# Patient Record
Sex: MALE | Race: WHITE | NOT HISPANIC OR LATINO | Employment: FULL TIME | URBAN - METROPOLITAN AREA
[De-identification: names, ages, dates, MRNs, and addresses within clinical notes are randomized per-mention and may not be internally consistent; named-entity substitution may affect disease eponyms.]

---

## 2017-07-12 RX ORDER — LISINOPRIL 20 MG/1
TABLET ORAL
Qty: 30 TABLET | Refills: 0 | Status: SHIPPED | OUTPATIENT
Start: 2017-07-12 | End: 2021-11-19 | Stop reason: SDUPTHER

## 2023-05-05 ENCOUNTER — APPOINTMENT (OUTPATIENT)
Dept: CT IMAGING | Facility: HOSPITAL | Age: 58
End: 2023-05-05
Payer: COMMERCIAL

## 2023-05-05 ENCOUNTER — HOSPITAL ENCOUNTER (OUTPATIENT)
Facility: HOSPITAL | Age: 58
Setting detail: OBSERVATION
Discharge: HOME | End: 2023-05-06
Attending: EMERGENCY MEDICINE | Admitting: STUDENT IN AN ORGANIZED HEALTH CARE EDUCATION/TRAINING PROGRAM
Payer: COMMERCIAL

## 2023-05-05 ENCOUNTER — APPOINTMENT (OUTPATIENT)
Dept: GENERAL RADIOLOGY | Facility: HOSPITAL | Age: 58
End: 2023-05-05
Payer: COMMERCIAL

## 2023-05-05 DIAGNOSIS — R19.7 DIARRHEA, UNSPECIFIED TYPE: ICD-10-CM

## 2023-05-05 DIAGNOSIS — N17.9 AKI (ACUTE KIDNEY INJURY): ICD-10-CM

## 2023-05-05 DIAGNOSIS — R11.2 NAUSEA AND VOMITING, UNSPECIFIED VOMITING TYPE: ICD-10-CM

## 2023-05-05 DIAGNOSIS — N28.89 LEFT RENAL MASS: ICD-10-CM

## 2023-05-05 DIAGNOSIS — D72.829 LEUKOCYTOSIS, UNSPECIFIED TYPE: Primary | ICD-10-CM

## 2023-05-05 LAB
ALBUMIN SERPL-MCNC: 4.4 G/DL (ref 3.5–5.2)
ALBUMIN/GLOB SERPL: 1.4 G/DL
ALP SERPL-CCNC: 89 U/L (ref 39–117)
ALT SERPL W P-5'-P-CCNC: 16 U/L (ref 1–41)
ANION GAP SERPL CALCULATED.3IONS-SCNC: 14.3 MMOL/L (ref 5–15)
AST SERPL-CCNC: 14 U/L (ref 1–40)
BACTERIA UR QL AUTO: ABNORMAL /HPF
BASOPHILS # BLD AUTO: 0.02 10*3/MM3 (ref 0–0.2)
BASOPHILS NFR BLD AUTO: 0.1 % (ref 0–1.5)
BILIRUB SERPL-MCNC: 1 MG/DL (ref 0–1.2)
BILIRUB UR QL STRIP: NEGATIVE
BUN SERPL-MCNC: 21 MG/DL (ref 6–20)
BUN/CREAT SERPL: 13.4 (ref 7–25)
CALCIUM SPEC-SCNC: 8.8 MG/DL (ref 8.6–10.5)
CHLORIDE SERPL-SCNC: 99 MMOL/L (ref 98–107)
CLARITY UR: CLEAR
CO2 SERPL-SCNC: 21.7 MMOL/L (ref 22–29)
COLOR UR: YELLOW
CREAT SERPL-MCNC: 1.57 MG/DL (ref 0.76–1.27)
D-LACTATE SERPL-SCNC: 2.1 MMOL/L (ref 0.5–2)
DEPRECATED RDW RBC AUTO: 43.4 FL (ref 37–54)
EGFRCR SERPLBLD CKD-EPI 2021: 51.7 ML/MIN/1.73
EOSINOPHIL # BLD AUTO: 0 10*3/MM3 (ref 0–0.4)
EOSINOPHIL NFR BLD AUTO: 0 % (ref 0.3–6.2)
ERYTHROCYTE [DISTWIDTH] IN BLOOD BY AUTOMATED COUNT: 13.6 % (ref 12.3–15.4)
GLOBULIN UR ELPH-MCNC: 3.2 GM/DL
GLUCOSE SERPL-MCNC: 179 MG/DL (ref 65–99)
GLUCOSE UR STRIP-MCNC: NEGATIVE MG/DL
HCT VFR BLD AUTO: 51.2 % (ref 37.5–51)
HGB BLD-MCNC: 16.9 G/DL (ref 13–17.7)
HGB UR QL STRIP.AUTO: ABNORMAL
HOLD SPECIMEN: NORMAL
HOLD SPECIMEN: NORMAL
HYALINE CASTS UR QL AUTO: ABNORMAL /LPF
IMM GRANULOCYTES # BLD AUTO: 0.09 10*3/MM3 (ref 0–0.05)
IMM GRANULOCYTES NFR BLD AUTO: 0.5 % (ref 0–0.5)
KETONES UR QL STRIP: NEGATIVE
LEUKOCYTE ESTERASE UR QL STRIP.AUTO: NEGATIVE
LIPASE SERPL-CCNC: 13 U/L (ref 13–60)
LYMPHOCYTES # BLD AUTO: 1.08 10*3/MM3 (ref 0.7–3.1)
LYMPHOCYTES NFR BLD AUTO: 5.4 % (ref 19.6–45.3)
MCH RBC QN AUTO: 28.4 PG (ref 26.6–33)
MCHC RBC AUTO-ENTMCNC: 33 G/DL (ref 31.5–35.7)
MCV RBC AUTO: 85.9 FL (ref 79–97)
MONOCYTES # BLD AUTO: 1.07 10*3/MM3 (ref 0.1–0.9)
MONOCYTES NFR BLD AUTO: 5.4 % (ref 5–12)
NEUTROPHILS NFR BLD AUTO: 17.56 10*3/MM3 (ref 1.7–7)
NEUTROPHILS NFR BLD AUTO: 88.6 % (ref 42.7–76)
NITRITE UR QL STRIP: NEGATIVE
PH UR STRIP.AUTO: 5.5 [PH] (ref 4.5–8)
PLATELET # BLD AUTO: 232 10*3/MM3 (ref 140–450)
PMV BLD AUTO: 10.2 FL (ref 6–12)
POTASSIUM SERPL-SCNC: 3.8 MMOL/L (ref 3.5–5.2)
PROCALCITONIN SERPL-MCNC: 0.66 NG/ML (ref 0–0.25)
PROT SERPL-MCNC: 7.6 G/DL (ref 6–8.5)
PROT UR QL STRIP: ABNORMAL
RBC # BLD AUTO: 5.96 10*6/MM3 (ref 4.14–5.8)
RBC # UR STRIP: ABNORMAL /HPF
REF LAB TEST METHOD: ABNORMAL
SODIUM SERPL-SCNC: 135 MMOL/L (ref 136–145)
SP GR UR STRIP: 1.02 (ref 1–1.03)
SQUAMOUS #/AREA URNS HPF: ABNORMAL /HPF
UROBILINOGEN UR QL STRIP: ABNORMAL
WBC # UR STRIP: ABNORMAL /HPF
WBC NRBC COR # BLD: 19.82 10*3/MM3 (ref 3.4–10.8)
WHOLE BLOOD HOLD COAG: NORMAL
WHOLE BLOOD HOLD SPECIMEN: NORMAL

## 2023-05-05 PROCEDURE — 25010000002 MORPHINE PER 10 MG: Performed by: EMERGENCY MEDICINE

## 2023-05-05 PROCEDURE — 74177 CT ABD & PELVIS W/CONTRAST: CPT

## 2023-05-05 PROCEDURE — 81001 URINALYSIS AUTO W/SCOPE: CPT

## 2023-05-05 PROCEDURE — 96376 TX/PRO/DX INJ SAME DRUG ADON: CPT

## 2023-05-05 PROCEDURE — G0378 HOSPITAL OBSERVATION PER HR: HCPCS

## 2023-05-05 PROCEDURE — 87086 URINE CULTURE/COLONY COUNT: CPT

## 2023-05-05 PROCEDURE — 25510000001 IOPAMIDOL PER 1 ML: Performed by: EMERGENCY MEDICINE

## 2023-05-05 PROCEDURE — 25010000002 ONDANSETRON PER 1 MG: Performed by: STUDENT IN AN ORGANIZED HEALTH CARE EDUCATION/TRAINING PROGRAM

## 2023-05-05 PROCEDURE — 25010000002 ONDANSETRON PER 1 MG

## 2023-05-05 PROCEDURE — 96375 TX/PRO/DX INJ NEW DRUG ADDON: CPT

## 2023-05-05 PROCEDURE — 85025 COMPLETE CBC W/AUTO DIFF WBC: CPT | Performed by: EMERGENCY MEDICINE

## 2023-05-05 PROCEDURE — 87040 BLOOD CULTURE FOR BACTERIA: CPT

## 2023-05-05 PROCEDURE — 80053 COMPREHEN METABOLIC PANEL: CPT | Performed by: EMERGENCY MEDICINE

## 2023-05-05 PROCEDURE — 36415 COLL VENOUS BLD VENIPUNCTURE: CPT

## 2023-05-05 PROCEDURE — 84145 PROCALCITONIN (PCT): CPT

## 2023-05-05 PROCEDURE — 96365 THER/PROPH/DIAG IV INF INIT: CPT

## 2023-05-05 PROCEDURE — 83690 ASSAY OF LIPASE: CPT | Performed by: EMERGENCY MEDICINE

## 2023-05-05 PROCEDURE — 83605 ASSAY OF LACTIC ACID: CPT

## 2023-05-05 PROCEDURE — 71045 X-RAY EXAM CHEST 1 VIEW: CPT

## 2023-05-05 PROCEDURE — 99284 EMERGENCY DEPT VISIT MOD MDM: CPT

## 2023-05-05 PROCEDURE — 94640 AIRWAY INHALATION TREATMENT: CPT

## 2023-05-05 RX ORDER — LOPERAMIDE HYDROCHLORIDE 2 MG/1
2 CAPSULE ORAL ONCE
Status: COMPLETED | OUTPATIENT
Start: 2023-05-05 | End: 2023-05-05

## 2023-05-05 RX ORDER — SODIUM CHLORIDE 0.9 % (FLUSH) 0.9 %
10 SYRINGE (ML) INJECTION EVERY 12 HOURS SCHEDULED
Status: DISCONTINUED | OUTPATIENT
Start: 2023-05-05 | End: 2023-05-06 | Stop reason: HOSPADM

## 2023-05-05 RX ORDER — ONDANSETRON 2 MG/ML
4 INJECTION INTRAMUSCULAR; INTRAVENOUS EVERY 6 HOURS PRN
Status: DISCONTINUED | OUTPATIENT
Start: 2023-05-05 | End: 2023-05-06 | Stop reason: HOSPADM

## 2023-05-05 RX ORDER — ONDANSETRON 2 MG/ML
8 INJECTION INTRAMUSCULAR; INTRAVENOUS ONCE
Status: COMPLETED | OUTPATIENT
Start: 2023-05-05 | End: 2023-05-05

## 2023-05-05 RX ORDER — KETOROLAC TROMETHAMINE 30 MG/ML
30 INJECTION, SOLUTION INTRAMUSCULAR; INTRAVENOUS EVERY 6 HOURS PRN
Status: DISCONTINUED | OUTPATIENT
Start: 2023-05-05 | End: 2023-05-05

## 2023-05-05 RX ORDER — ACETAMINOPHEN 500 MG
1000 TABLET ORAL EVERY 8 HOURS
Status: DISCONTINUED | OUTPATIENT
Start: 2023-05-05 | End: 2023-05-06 | Stop reason: HOSPADM

## 2023-05-05 RX ORDER — SODIUM CHLORIDE, SODIUM LACTATE, POTASSIUM CHLORIDE, CALCIUM CHLORIDE 600; 310; 30; 20 MG/100ML; MG/100ML; MG/100ML; MG/100ML
100 INJECTION, SOLUTION INTRAVENOUS CONTINUOUS
Status: DISCONTINUED | OUTPATIENT
Start: 2023-05-05 | End: 2023-05-06

## 2023-05-05 RX ORDER — OXYCODONE HYDROCHLORIDE 5 MG/1
5 TABLET ORAL EVERY 4 HOURS PRN
Status: DISCONTINUED | OUTPATIENT
Start: 2023-05-05 | End: 2023-05-06 | Stop reason: HOSPADM

## 2023-05-05 RX ORDER — SODIUM CHLORIDE 0.9 % (FLUSH) 0.9 %
10 SYRINGE (ML) INJECTION AS NEEDED
Status: DISCONTINUED | OUTPATIENT
Start: 2023-05-05 | End: 2023-05-06 | Stop reason: HOSPADM

## 2023-05-05 RX ORDER — ACETAMINOPHEN 500 MG
1000 TABLET ORAL EVERY 8 HOURS PRN
Status: DISCONTINUED | OUTPATIENT
Start: 2023-05-05 | End: 2023-05-05

## 2023-05-05 RX ORDER — SODIUM CHLORIDE 9 MG/ML
40 INJECTION, SOLUTION INTRAVENOUS AS NEEDED
Status: DISCONTINUED | OUTPATIENT
Start: 2023-05-05 | End: 2023-05-06 | Stop reason: HOSPADM

## 2023-05-05 RX ORDER — BUDESONIDE AND FORMOTEROL FUMARATE DIHYDRATE 160; 4.5 UG/1; UG/1
1 AEROSOL RESPIRATORY (INHALATION)
Status: DISCONTINUED | OUTPATIENT
Start: 2023-05-05 | End: 2023-05-06 | Stop reason: HOSPADM

## 2023-05-05 RX ORDER — ACETAMINOPHEN 500 MG
1000 TABLET ORAL ONCE
Status: COMPLETED | OUTPATIENT
Start: 2023-05-05 | End: 2023-05-05

## 2023-05-05 RX ORDER — OXYCODONE HYDROCHLORIDE 5 MG/1
TABLET ORAL
Status: COMPLETED
Start: 2023-05-05 | End: 2023-05-05

## 2023-05-05 RX ORDER — CHOLECALCIFEROL (VITAMIN D3) 125 MCG
5 CAPSULE ORAL NIGHTLY PRN
Status: DISCONTINUED | OUTPATIENT
Start: 2023-05-05 | End: 2023-05-06 | Stop reason: HOSPADM

## 2023-05-05 RX ORDER — ACETAMINOPHEN 500 MG
TABLET ORAL
Status: COMPLETED
Start: 2023-05-05 | End: 2023-05-05

## 2023-05-05 RX ADMIN — MORPHINE SULFATE 4 MG: 4 INJECTION, SOLUTION INTRAMUSCULAR; INTRAVENOUS at 14:29

## 2023-05-05 RX ADMIN — OXYCODONE HYDROCHLORIDE 5 MG: 5 TABLET ORAL at 21:02

## 2023-05-05 RX ADMIN — ACETAMINOPHEN 1000 MG: 500 TABLET, FILM COATED ORAL at 21:02

## 2023-05-05 RX ADMIN — SODIUM CHLORIDE 1000 ML: 9 INJECTION, SOLUTION INTRAVENOUS at 16:56

## 2023-05-05 RX ADMIN — MORPHINE SULFATE 4 MG: 4 INJECTION, SOLUTION INTRAMUSCULAR; INTRAVENOUS at 17:43

## 2023-05-05 RX ADMIN — BUDESONIDE AND FORMOTEROL FUMARATE DIHYDRATE 1 PUFF: 160; 4.5 AEROSOL RESPIRATORY (INHALATION) at 21:51

## 2023-05-05 RX ADMIN — ONDANSETRON 8 MG: 2 INJECTION INTRAMUSCULAR; INTRAVENOUS at 13:54

## 2023-05-05 RX ADMIN — LOPERAMIDE HYDROCHLORIDE 2 MG: 2 CAPSULE ORAL at 13:54

## 2023-05-05 RX ADMIN — SODIUM CHLORIDE 1000 ML: 9 INJECTION, SOLUTION INTRAVENOUS at 13:55

## 2023-05-05 RX ADMIN — ACETAMINOPHEN 1000 MG: 500 TABLET ORAL at 21:02

## 2023-05-05 RX ADMIN — IOPAMIDOL 100 ML: 755 INJECTION, SOLUTION INTRAVENOUS at 15:03

## 2023-05-05 RX ADMIN — ONDANSETRON 4 MG: 2 INJECTION INTRAMUSCULAR; INTRAVENOUS at 21:10

## 2023-05-05 RX ADMIN — SODIUM CHLORIDE, POTASSIUM CHLORIDE, SODIUM LACTATE AND CALCIUM CHLORIDE 100 ML/HR: 600; 310; 30; 20 INJECTION, SOLUTION INTRAVENOUS at 21:03

## 2023-05-05 RX ADMIN — Medication 10 ML: at 21:02

## 2023-05-05 RX ADMIN — ACETAMINOPHEN 1000 MG: 500 TABLET ORAL at 16:55

## 2023-05-05 NOTE — ED NOTES
"Nursing report ED to floor  Carroll Zamora  57 y.o.  male    HPI :   Chief Complaint   Patient presents with    Abdominal Pain     Starting 2days ago, NVD - pt reports dry heaves now with \"about 20 episodes of diarrhea today\", has not taken any OTC meds        Admitting doctor:   Juan M Hilario MD    Admitting diagnosis:   The primary encounter diagnosis was Leukocytosis, unspecified type. Diagnoses of Diarrhea, unspecified type, Nausea and vomiting, unspecified vomiting type, DANE (acute kidney injury), and Left renal mass were also pertinent to this visit.    Code status:   Current Code Status       Date Active Code Status Order ID Comments User Context       Not on file            Allergies:   Patient has no known allergies.    Isolation:   No active isolations    Intake and Output    Intake/Output Summary (Last 24 hours) at 5/5/2023 1944  Last data filed at 5/5/2023 1651  Gross per 24 hour   Intake --   Output 200 ml   Net -200 ml       Weight:       05/05/23  1334   Weight: 109 kg (240 lb)       Most recent vitals:   Vitals:    05/05/23 1748 05/05/23 1801 05/05/23 1831 05/05/23 1931   BP: 131/79 133/80 130/79 136/78   BP Location: Right arm Right arm Right arm Right arm   Patient Position: Lying Lying Lying Lying   Pulse: 100 96 89 82   Resp: 16 16 16 16   Temp:       TempSrc:       SpO2: 95% 94% 93% 94%   Weight:       Height:           Active LDAs/IV Access:   Lines, Drains & Airways       Active LDAs       Name Placement date Placement time Site Days    Peripheral IV 05/05/23 1347 Right Forearm 05/05/23  1347  Forearm  less than 1                    Labs (abnormal labs have a star):   Labs Reviewed   COMPREHENSIVE METABOLIC PANEL - Abnormal; Notable for the following components:       Result Value    Glucose 179 (*)     BUN 21 (*)     Creatinine 1.57 (*)     Sodium 135 (*)     CO2 21.7 (*)     eGFR 51.7 (*)     All other components within normal limits    Narrative:     GFR Normal >60  Chronic Kidney " "Disease <60  Kidney Failure <15     CBC WITH AUTO DIFFERENTIAL - Abnormal; Notable for the following components:    WBC 19.82 (*)     RBC 5.96 (*)     Hematocrit 51.2 (*)     Neutrophil % 88.6 (*)     Lymphocyte % 5.4 (*)     Eosinophil % 0.0 (*)     Neutrophils, Absolute 17.56 (*)     Monocytes, Absolute 1.07 (*)     Immature Grans, Absolute 0.09 (*)     All other components within normal limits   LACTIC ACID, PLASMA - Abnormal; Notable for the following components:    Lactate 2.1 (*)     All other components within normal limits   PROCALCITONIN - Abnormal; Notable for the following components:    Procalcitonin 0.66 (*)     All other components within normal limits    Narrative:     As a Marker for Sepsis (Non-Neonates):    1. <0.5 ng/mL represents a low risk of severe sepsis and/or septic shock.  2. >2 ng/mL represents a high risk of severe sepsis and/or septic shock.    As a Marker for Lower Respiratory Tract Infections that require antibiotic therapy:    PCT on Admission    Antibiotic Therapy       6-12 Hrs later    >0.5                Strongly Recommended  >0.25 - <0.5        Recommended   0.1 - 0.25          Discouraged              Remeasure/reassess PCT  <0.1                Strongly Discouraged     Remeasure/reassess PCT    As 28 day mortality risk marker: \"Change in Procalcitonin Result\" (>80% or <=80%) if Day 0 (or Day 1) and Day 4 values are available. Refer to http://www.[facility]-pct-calculator.com    Change in PCT <=80%  A decrease of PCT levels below or equal to 80% defines a positive change in PCT test result representing a higher risk for 28-day all-cause mortality of patients diagnosed with severe sepsis for septic shock.    Change in PCT >80%  A decrease of PCT levels of more than 80% defines a negative change in PCT result representing a lower risk for 28-day all-cause mortality of patients diagnosed with severe sepsis or septic shock.      URINALYSIS W/ CULTURE IF INDICATED - Abnormal; Notable for " the following components:    Blood, UA Small (1+) (*)     Protein,  mg/dL (2+) (*)     All other components within normal limits    Narrative:     In absence of clinical symptoms, the presence of pyuria, bacteria, and/or nitrites on the urinalysis result does not correlate with infection.   URINALYSIS, MICROSCOPIC ONLY - Abnormal; Notable for the following components:    RBC, UA 3-5 (*)     All other components within normal limits   LIPASE - Normal   BLOOD CULTURE   BLOOD CULTURE   URINE CULTURE   RAINBOW DRAW    Narrative:     The following orders were created for panel order [place] Draw.  Procedure                               Abnormality         Status                     ---------                               -----------         ------                     Green Top (Gel)[[id number]]                                  Final result               Lavender Top[[id number]]                                     Final result               Gold Top - SST[[id number]]                                   Final result               Light Blue Top[[id number]]                                   Final result                 Please view results for these tests on the individual orders.   CBC AND DIFFERENTIAL    Narrative:     The following orders were created for panel order CBC & Differential.  Procedure                               Abnormality         Status                     ---------                               -----------         ------                     CBC Auto Differential[[id number]]        Abnormal            Final result                 Please view results for these tests on the individual orders.   GREEN TOP   LAVENDER TOP   GOLD TOP - SST   LIGHT BLUE TOP       EKG:   No orders to display       Meds given in ED:   Medications   sodium chloride 0.9 % flush 10 mL (has no administration in time range)   ondansetron (ZOFRAN) injection 8 mg (8 mg Intravenous Given 5/5/23 2469)   loperamide (IMODIUM) capsule 2 mg (2  mg Oral Given 5/5/23 1354)   sodium chloride 0.9 % bolus 1,000 mL (0 mL Intravenous Stopped 5/5/23 1425)   morphine injection 4 mg (4 mg Intravenous Given 5/5/23 1429)   iopamidol (ISOVUE-370) 76 % injection 100 mL (100 mL Intravenous Given 5/5/23 1503)   sodium chloride 0.9 % bolus 1,000 mL (0 mL Intravenous Stopped 5/5/23 1726)   acetaminophen (TYLENOL) tablet 1,000 mg (1,000 mg Oral Given 5/5/23 1655)   piperacillin-tazobactam (ZOSYN) 4.5 g/100 mL 0.9% NS IVPB (mbp) (0 g Intravenous Stopped 5/5/23 1814)       Imaging results:  CT Abdomen Pelvis With Contrast    Result Date: 5/5/2023  1. Large partially exophytic enhancing mass arising from the upper pole left kidney most characteristic of renal cell carcinoma measuring up to 6 cm. No threshold adenopathy. Renal veins patent. Urologic referral recommended for further care and management. 2. No CT finding to account for the patient's complaint of right lower quadrant pain. Appendix normal. 3. Mild hepatic steatosis. 4. Trace to small amount of free fluid in the pelvis. 5. No suspicious bone lesion. 6. Abnormal findings personally discussed by telephone with Rosy Hester in the emergency department.  This report was finalized on 5/5/2023 3:33 PM by Dr. Benjamín Alicia MD.       Ambulatory status:   - up at [facility]    Social issues:   Social History     Socioeconomic History    Marital status:    Tobacco Use    Smoking status: Never    Smokeless tobacco: Never   Vaping Use    Vaping Use: Never used   Substance and Sexual Activity    Alcohol use: Yes     Alcohol/week: 1.0 standard drink     Types: 1 Glasses of wine per week     Comment: onr drink per week    Drug use: No    Sexual activity: Yes     Partners: Female       NIH Stroke Scale:         Nuvia Miller RN  05/05/23 19:44 EDT

## 2023-05-05 NOTE — H&P
[facility] HOSPITALIST     PCP: Divina Olvera MD    CODE status: Full Code    CHIEF COMPLAINT: Abdominal pain and diarrhea    HISTORY OF PRESENT ILLNESS:    57 year old male with HTN and asthma presents with abdominal pain and diarrhea. He reports that 2 days ago he started having frequent episodes of watery stool associated with crampy abdominal pain. Describes his BMs as large volume, foul smelling and watery. Does endorse small amount of bright red blood with wiping but reports history of hemorrhoids and that this is a chronic issue. At symptom onset was initially having 3-4 episodes per day however on the day of admission states that he has been having these episodes every 2 hours. He also epigastric abdominal cramping that occurs worse with eating or drinking. Has had one episode of NBNB emesis but denies hematemesis or melena. Patient states that he has not been able to eat or drink anything over the past few days and feels very dehydrated. Denies recent diet changes or suspicious foods but does state that his son had similar symptoms approximately one week ago that self resolved in 3-4 days. No recent antibiotic use. Denies fever, chest pain, SOA, cough or dysuria.    Workup on admission notable for WBC 19.8 and DANE with Cr 1.57 (0.9 in 2021). CT a/p did not show obvious etiology for his symptoms but did show a large exophytic mass of the left kidney concerning for RCC. Urology was consulted in the ED and recommended f/u in clinic on discharge. Given DANE and concern for sepsis medicine was asked to admit the patient for further evaluation.      Past Medical History:   Diagnosis Date   • Allergic    • Hypertension      Past Surgical History:   Procedure Laterality Date   • ROTATOR CUFF REPAIR Right 2007     Family History   Problem Relation Age of Onset   • Coronary artery disease Mother    • Stroke Father    • Lung cancer Father    • Alcohol abuse Father    • Coronary artery  "disease Father      Social History     Tobacco Use   • Smoking status: Never   • Smokeless tobacco: Never   Vaping Use   • Vaping Use: Never used   Substance Use Topics   • Alcohol use: Yes     Alcohol/week: 1.0 standard drink     Types: 1 Glasses of wine per week     Comment: onr drink per week   • Drug use: No     (Not in a hospital admission)    Allergies:  Patient has no known allergies.    Immunization History   Administered Date(s) Administered   • FluMist 2-49yrs 11/02/2015   • Pneumococcal Polysaccharide (PPSV23) 02/17/2016       REVIEW OF SYSTEMS:  Please see the above history of present illness for pertinent positives and negatives.  The remainder of the patient's systems have been reviewed and are negative.     Vital Signs  Temp:  [98.3 °F (36.8 °C)] 98.3 °F (36.8 °C)  Heart Rate:  [] 82  Resp:  [16-20] 16  BP: (127-150)/(78-97) 136/78  Flowsheet Rows    Flowsheet Row First Filed Value   Admission Height 185.4 cm (73\") Documented at 05/05/2023 1334   Admission Weight 109 kg (240 lb) Documented at 05/05/2023 1334             Physical Exam:  General: Awake and alert, no acute distress  HENT: NCAT, dry oral mucosa  Cardiovascular: RRR, no m/r/g, no edema  Respiratory: CTAB, no wheezes, no crackles  Abdominal/GI: Hyperactive bowel sounds, mild diffuse tenderness to palpation, no rebound, no guarding, negative Vanessa's sign  Musculoskeletal: Normal strength and ROM   Skin: Warm and dry, no rash  Psych: Mood and affect are appropriate. Cooperative with exam.   Neuro: CN II-XII grossly intact, no focal deficits    Emotional Behavior: all of the following were found to be normal   Judgment and Insight   Mental Status   Memory   Mood and Affect: neither of the following found acutely        Depression                 Anxiety     Debilities: no signs of the following found    Physical Weakness     Handicaps     Disabilities     Agitation         Results Review:    I reviewed the patient's new clinical " results.  Lab Results (most recent)     Procedure Component Value Units Date/Time    Urinalysis, Microscopic Only - Urine, Clean Catch [[id number]]  (Abnormal) Collected: 05/05/23 1648    Specimen: Urine, Clean Catch Updated: 05/05/23 1810     RBC, UA 3-5 /HPF      WBC, UA None Seen /HPF      Bacteria, UA None Seen /HPF      Squamous Epithelial Cells, UA 0-2 /HPF      Hyaline Casts, UA 3-6 /LPF      Methodology Manual Light Microscopy    Urinalysis With Culture If Indicated - Urine, Clean Catch [[id number]]  (Abnormal) Collected: 05/05/23 1648    Specimen: Urine, Clean Catch Updated: 05/05/23 1755     Color, UA Yellow     Appearance, UA Clear     pH, UA 5.5     Specific Gravity, UA 1.020     Glucose, UA Negative     Ketones, UA Negative     Bilirubin, UA Negative     Blood, UA Small (1+)     Protein,  mg/dL (2+)     Leuk Esterase, UA Negative     Nitrite, UA Negative     Urobilinogen, UA 0.2 E.U./dL    Narrative:      In absence of clinical symptoms, the presence of pyuria, bacteria, and/or nitrites on the urinalysis result does not correlate with infection.    Urine Culture - Urine, Urine, Clean Catch [[id number]] Collected: 05/05/23 1648    Specimen: Urine, Clean Catch Updated: 05/05/23 1752    Lactic Acid, Plasma [[id number]]  (Abnormal) Collected: 05/05/23 1630    Specimen: Blood Updated: 05/05/23 1656     Lactate 2.1 mmol/L     Blood Culture - Blood, Hand, Right [[id number]] Collected: 05/05/23 1631    Specimen: Blood from Hand, Right Updated: 05/05/23 1631    Blood Culture - Blood, Arm, Left [[id number]] Collected: 05/05/23 1614    Specimen: Blood from Arm, Left Updated: 05/05/23 1627    Procalcitonin [[id number]]  (Abnormal) Collected: 05/05/23 1342    Specimen: Blood Updated: 05/05/23 1612     Procalcitonin 0.66 ng/mL     Narrative:      As a Marker for Sepsis (Non-Neonates):    1. <0.5 ng/mL represents a low risk of severe sepsis and/or septic shock.  2. >2 ng/mL represents a high risk of severe sepsis  "and/or septic shock.    As a Marker for Lower Respiratory Tract Infections that require antibiotic therapy:    PCT on Admission    Antibiotic Therapy       6-12 Hrs later    >0.5                Strongly Recommended  >0.25 - <0.5        Recommended   0.1 - 0.25          Discouraged              Remeasure/reassess PCT  <0.1                Strongly Discouraged     Remeasure/reassess PCT    As 28 day mortality risk marker: \"Change in Procalcitonin Result\" (>80% or <=80%) if Day 0 (or Day 1) and Day 4 values are available. Refer to http://www.[facility]-pct-calculator.com    Change in PCT <=80%  A decrease of PCT levels below or equal to 80% defines a positive change in PCT test result representing a higher risk for 28-day all-cause mortality of patients diagnosed with severe sepsis for septic shock.    Change in PCT >80%  A decrease of PCT levels of more than 80% defines a negative change in PCT result representing a lower risk for 28-day all-cause mortality of patients diagnosed with severe sepsis or septic shock.       [place] Draw [[id number]] Collected: 05/05/23 1342    Specimen: Blood Updated: 05/05/23 1445    Narrative:      The following orders were created for panel order [place] Draw.  Procedure                               Abnormality         Status                     ---------                               -----------         ------                     Green Top (Gel)[[id number]]                                  Final result               Lavender Top[[id number]]                                     Final result               Gold Top - SST[[id number]]                                   Final result               Light Blue Top[[id number]]                                   Final result                 Please view results for these tests on the individual orders.    Lavender Top [[id number]] Collected: 05/05/23 1342    Specimen: Blood Updated: 05/05/23 144     Extra Tube hold for add-on     Comment: Auto resulted       " Light Blue Top [[id number]] Collected: 05/05/23 1342    Specimen: Blood Updated: 05/05/23 1445     Extra Tube Hold for add-ons.     Comment: Auto resulted       Green Top (Gel) [[id number]] Collected: 05/05/23 1342    Specimen: Blood Updated: 05/05/23 1445     Extra Tube Hold for add-ons.     Comment: Auto resulted.       Gold Top - SST [[id number]] Collected: 05/05/23 1342    Specimen: Blood Updated: 05/05/23 1445     Extra Tube Hold for add-ons.     Comment: Auto resulted.       Comprehensive Metabolic Panel [[id number]]  (Abnormal) Collected: 05/05/23 1342    Specimen: Blood Updated: 05/05/23 1425     Glucose 179 mg/dL      BUN 21 mg/dL      Creatinine 1.57 mg/dL      Sodium 135 mmol/L      Potassium 3.8 mmol/L      Chloride 99 mmol/L      CO2 21.7 mmol/L      Calcium 8.8 mg/dL      Total Protein 7.6 g/dL      Albumin 4.4 g/dL      ALT (SGPT) 16 U/L      AST (SGOT) 14 U/L      Alkaline Phosphatase 89 U/L      Total Bilirubin 1.0 mg/dL      Globulin 3.2 gm/dL      A/G Ratio 1.4 g/dL      BUN/Creatinine Ratio 13.4     Anion Gap 14.3 mmol/L      eGFR 51.7 mL/min/1.73     Narrative:      GFR Normal >60  Chronic Kidney Disease <60  Kidney Failure <15      Lipase [[id number]]  (Normal) Collected: 05/05/23 1342    Specimen: Blood Updated: 05/05/23 1425     Lipase 13 U/L     CBC & Differential [[id number]]  (Abnormal) Collected: 05/05/23 1342    Specimen: Blood Updated: 05/05/23 1412    Narrative:      The following orders were created for panel order CBC & Differential.  Procedure                               Abnormality         Status                     ---------                               -----------         ------                     CBC Auto Differential[[id number]]        Abnormal            Final result                 Please view results for these tests on the individual orders.    CBC Auto Differential [[id number]]  (Abnormal) Collected: 05/05/23 1342    Specimen: Blood Updated: 05/05/23 1412     WBC 19.82 10*3/mm3       RBC 5.96 10*6/mm3      Hemoglobin 16.9 g/dL      Hematocrit 51.2 %      MCV 85.9 fL      MCH 28.4 pg      MCHC 33.0 g/dL      RDW 13.6 %      RDW-SD 43.4 fl      MPV 10.2 fL      Platelets 232 10*3/mm3      Neutrophil % 88.6 %      Lymphocyte % 5.4 %      Monocyte % 5.4 %      Eosinophil % 0.0 %      Basophil % 0.1 %      Immature Grans % 0.5 %      Neutrophils, Absolute 17.56 10*3/mm3      Lymphocytes, Absolute 1.08 10*3/mm3      Monocytes, Absolute 1.07 10*3/mm3      Eosinophils, Absolute 0.00 10*3/mm3      Basophils, Absolute 0.02 10*3/mm3      Immature Grans, Absolute 0.09 10*3/mm3           Imaging Results (Most Recent)     Procedure Component Value Units Date/Time    CT Abdomen Pelvis With Contrast [[id number]] Collected: 05/05/23 1523     Updated: 05/05/23 1535    Narrative:      ABDOMEN AND PELVIS CT WITH CONTRAST 05/05/2023     HISTORY:  Right lower quadrant abdominal pain. Nausea and vomiting. Symptoms for  the past 2 days. No history of prior surgery.     TECHNIQUE:   Contrast enhanced CT abdomen and pelvis was performed. No comparisons.  Radiation dose reduction techniques included automated exposure control  or exposure modulation based on body size. Radiation audit for CT and  nuclear cardiology exams in the last 12 months: 0.     ABDOMEN FINDINGS:   Included lung bases show dependent atelectasis there is no effusion.  Normal caliber aorta. The spleen is unremarkable and the adrenal glands  are negative. The pancreas is negative. The gallbladder is unremarkable  and there is mild hepatic steatosis. There is a small cyst in the  anterior midpole right kidney. This measures about 1.6 cm. The left  kidney is abnormal. There is a partially exophytic enhancing mass  occupying majority of the upper pole left kidney. This measures 6.9 x  6.0 x 6.0 cm. Imaging features are most characteristic of renal cell  carcinoma. The left lateral margin of the mass abuts the spleen and  contiguous extension of  malignancy could present similarly although  there is no abnormal enhancement of the spleen visible at this time. The  renal veins are patent and the IVC is negative. There is no threshold  retroperitoneal adenopathy. There are small shotty subcentimeter short  axis retroperitoneal lymph nodes present.     PELVIS FINDINGS:   The bladder is negative. No drainable fluid collection. Negative  prostate gland. Small amount of free fluid in the pelvis, etiology  unclear. The bowel is nonobstructed and demonstrates some features in  keeping with diarrhea state. Negative terminal ileum. Normal appendix.  No inguinal adenopathy or fluid collection. There is no suspicious bone  lesion. Bilateral pars defects at L5-S1.  negative.       Impression:      1. Large partially exophytic enhancing mass arising from the upper pole  left kidney most characteristic of renal cell carcinoma measuring up to  6 cm. No threshold adenopathy. Renal veins patent. Urologic referral  recommended for further care and management.  2. No CT finding to account for the patient's complaint of right lower  quadrant pain. Appendix normal.  3. Mild hepatic steatosis.  4. Trace to small amount of free fluid in the pelvis.  5. No suspicious bone lesion.  6. Abnormal findings personally discussed by telephone with Rosy Hester in  the emergency department.     This report was finalized on 5/5/2023 3:33 PM by Dr. Benjamín Alicia MD.             ECG/EMG Results (most recent)     None        Assessment & Plan     1. Abdominal pain and diarrhea: Viral or bacterial GE vs C diff vs other. No mucus, significant bleeding or other red flag symptoms. Lipase normal. CT a/p with no obvious source. GI panel and C diff pending; If negative can start loperamide. Clear liquid diet, advance as tolerated. Continue prn zofran. Continue IVF     2. Leukocytosis. Likely due to acute diarrheal illness as well as reactive given severe dehydration. UA, CXR and CT a/p negative for  other source of infection. Procal 0.66. Received Zosyn in the ED, will hold further antibiotics at this time and monitor. Blood cultures and urine cultures pending. As above, GI panel and C diff ordered.    3. DANE: Cr 1.57 OA (0.9 in 2021). Likely prerenal secondary to diarrhea and poor PO intake. UA with 100 protein, small blood and 3-5 RBCs. PVR pending. CT does show renal mass as below but no hydronephrosis or other significant abnormalities. Continue IVF.    4. Renal Mass: CT showed a large partially exophytic mass of left kidney concerning for RCC. Urology consulted in the ED, recommended f/u in clinic on discharge. No plans for intervention while inpatient.    5. Asthma: Continue home inhalers    6. Hypertension: Holding home lisinopril-HCTZ in the setting of DANE and dehydration.    DVT PPX: SCDs      Juan M Hilario MD  05/05/23  19:53 EDT      At [facility], we believe that sharing information builds trust and better relationships. You are receiving this note because you recently visited [facility]. It is possible you will see health information before a provider has talked with you about it. This kind of information can be easy to misunderstand. To help you fully understand what it means for your health, we urge you to discuss this note with your provider.

## 2023-05-05 NOTE — ED PROVIDER NOTES
EMERGENCY DEPARTMENT ENCOUNTER      Room Number: 09/09    History is provided by the patient, no translation services needed    HPI:    Chief complaint: Abdominal pain    Narrative: Pt is a 57 y.o. male who presents complaining of abdominal pain, vomiting, diarrhea x2 days.  Patient reports symptoms have been constant, but come and go in severity.  Reports abdominal pain is primarily RLQ.  Associated fever and chills, fatigue, weakness, headache.  Patient has been taking Tylenol and ibuprofen with minimal relief.  Has not tried Imodium or antinausea medication.  No previous abdominal surgeries.     PMD: Divina Olvera MD    REVIEW OF SYSTEMS  Review of Systems   Constitutional: Positive for chills, fatigue and fever.   Eyes: Negative for pain and visual disturbance.   Respiratory: Negative for cough and shortness of breath.    Cardiovascular: Negative for chest pain and palpitations.   Gastrointestinal: Positive for abdominal pain, diarrhea, nausea and vomiting.   Genitourinary: Negative for difficulty urinating, dysuria and flank pain.   Musculoskeletal: Negative for gait problem and myalgias.   Skin: Negative for rash and wound.   Neurological: Positive for weakness and headaches. Negative for dizziness, syncope and numbness.   Psychiatric/Behavioral: Negative for confusion and suicidal ideas. The patient is not nervous/anxious.        PAST MEDICAL HISTORY  Active Ambulatory Problems     Diagnosis Date Noted   • Mild intermittent asthma 02/17/2016   • Essential hypertension 02/17/2016   • Obesity (BMI 30-39.9) 02/17/2016   • Screening PSA (prostate specific antigen) 02/17/2016   • Periodic health assessment, general screening, adult 02/17/2016   • Obstructive sleep apnea of adult 02/18/2016   • Weight gain 02/18/2016   • Weight loss 02/18/2016     Resolved Ambulatory Problems     Diagnosis Date Noted   • No Resolved Ambulatory Problems     Past Medical History:   Diagnosis Date   • Allergic    •  Hypertension        PAST SURGICAL HISTORY  Past Surgical History:   Procedure Laterality Date   • ROTATOR CUFF REPAIR Right 2007       FAMILY HISTORY  Family History   Problem Relation Age of Onset   • Coronary artery disease Mother    • Stroke Father    • Lung cancer Father    • Alcohol abuse Father    • Coronary artery disease Father        SOCIAL HISTORY  Social History     Socioeconomic History   • Marital status:    Tobacco Use   • Smoking status: Never   • Smokeless tobacco: Never   Vaping Use   • Vaping Use: Never used   Substance and Sexual Activity   • Alcohol use: Yes     Alcohol/week: 1.0 standard drink     Types: 1 Glasses of wine per week     Comment: onr drink per week   • Drug use: No   • Sexual activity: Yes     Partners: Female       ALLERGIES  Patient has no known allergies.      Current Facility-Administered Medications:   •  morphine injection 4 mg, 4 mg, Intravenous, Q4H PRN, Rizwan Reynolds MD, 4 mg at 05/05/23 1743  •  sodium chloride 0.9 % flush 10 mL, 10 mL, Intravenous, PRN, Rizwan Reynolds MD    Current Outpatient Medications:   •  Fluticasone Furoate-Vilanterol (BREO ELLIPTA) 100-25 MCG/INH inhaler, Inhale 1 puff As Needed., Disp: , Rfl:   •  levocetirizine (XYZAL) 2.5 MG/5ML solution, Take  by mouth., Disp: , Rfl:   •  lisinopril-hydrochlorothiazide (PRINZIDE,ZESTORETIC) 20-12.5 MG per tablet, , Disp: , Rfl:   •  montelukast (SINGULAIR) 10 MG tablet, Take 1 tablet (10 mg total) by mouth nightly., Disp: 90 tablet, Rfl: 3  •  sildenafil (VIAGRA) 100 MG tablet, Take 1 tablet by mouth As Needed., Disp: , Rfl:     PHYSICAL EXAM  ED Triage Vitals [05/05/23 1334]   Temp Heart Rate Resp BP SpO2   98.3 °F (36.8 °C) 114 20 141/93 95 %      Temp src Heart Rate Source Patient Position BP Location FiO2 (%)   Oral Monitor Lying Right arm --       Physical Exam  Vitals and nursing note reviewed.   Constitutional:       Appearance: He is normal weight. He is ill-appearing.       Comments: Patient lying in bed appears ill covered in blankets with chills on and off throughout ER stay.   HENT:      Head: Normocephalic and atraumatic.   Cardiovascular:      Rate and Rhythm: Regular rhythm. Tachycardia present.   Pulmonary:      Effort: Pulmonary effort is normal.      Breath sounds: Normal breath sounds.   Abdominal:      General: Abdomen is flat. There is no distension.      Palpations: Abdomen is soft.      Tenderness: There is generalized abdominal tenderness and tenderness in the right lower quadrant. There is guarding.      Hernia: No hernia is present.   Neurological:      General: No focal deficit present.      Mental Status: He is alert and oriented to person, place, and time.   Psychiatric:         Mood and Affect: Mood normal.         Behavior: Behavior normal.           LAB RESULTS  Lab Results (last 24 hours)     Procedure Component Value Units Date/Time    CBC & Differential [[id number]]  (Abnormal) Collected: 05/05/23 1342    Specimen: Blood Updated: 05/05/23 1412    Narrative:      The following orders were created for panel order CBC & Differential.  Procedure                               Abnormality         Status                     ---------                               -----------         ------                     CBC Auto Differential[[id number]]        Abnormal            Final result                 Please view results for these tests on the individual orders.    Comprehensive Metabolic Panel [[id number]]  (Abnormal) Collected: 05/05/23 1342    Specimen: Blood Updated: 05/05/23 1425     Glucose 179 mg/dL      BUN 21 mg/dL      Creatinine 1.57 mg/dL      Sodium 135 mmol/L      Potassium 3.8 mmol/L      Chloride 99 mmol/L      CO2 21.7 mmol/L      Calcium 8.8 mg/dL      Total Protein 7.6 g/dL      Albumin 4.4 g/dL      ALT (SGPT) 16 U/L      AST (SGOT) 14 U/L      Alkaline Phosphatase 89 U/L      Total Bilirubin 1.0 mg/dL      Globulin 3.2 gm/dL      A/G Ratio 1.4 g/dL       "BUN/Creatinine Ratio 13.4     Anion Gap 14.3 mmol/L      eGFR 51.7 mL/min/1.73     Narrative:      GFR Normal >60  Chronic Kidney Disease <60  Kidney Failure <15      Lipase [[id number]]  (Normal) Collected: 05/05/23 1342    Specimen: Blood Updated: 05/05/23 1425     Lipase 13 U/L     CBC Auto Differential [[id number]]  (Abnormal) Collected: 05/05/23 1342    Specimen: Blood Updated: 05/05/23 1412     WBC 19.82 10*3/mm3      RBC 5.96 10*6/mm3      Hemoglobin 16.9 g/dL      Hematocrit 51.2 %      MCV 85.9 fL      MCH 28.4 pg      MCHC 33.0 g/dL      RDW 13.6 %      RDW-SD 43.4 fl      MPV 10.2 fL      Platelets 232 10*3/mm3      Neutrophil % 88.6 %      Lymphocyte % 5.4 %      Monocyte % 5.4 %      Eosinophil % 0.0 %      Basophil % 0.1 %      Immature Grans % 0.5 %      Neutrophils, Absolute 17.56 10*3/mm3      Lymphocytes, Absolute 1.08 10*3/mm3      Monocytes, Absolute 1.07 10*3/mm3      Eosinophils, Absolute 0.00 10*3/mm3      Basophils, Absolute 0.02 10*3/mm3      Immature Grans, Absolute 0.09 10*3/mm3     Procalcitonin [[id number]]  (Abnormal) Collected: 05/05/23 1342    Specimen: Blood Updated: 05/05/23 1612     Procalcitonin 0.66 ng/mL     Narrative:      As a Marker for Sepsis (Non-Neonates):    1. <0.5 ng/mL represents a low risk of severe sepsis and/or septic shock.  2. >2 ng/mL represents a high risk of severe sepsis and/or septic shock.    As a Marker for Lower Respiratory Tract Infections that require antibiotic therapy:    PCT on Admission    Antibiotic Therapy       6-12 Hrs later    >0.5                Strongly Recommended  >0.25 - <0.5        Recommended   0.1 - 0.25          Discouraged              Remeasure/reassess PCT  <0.1                Strongly Discouraged     Remeasure/reassess PCT    As 28 day mortality risk marker: \"Change in Procalcitonin Result\" (>80% or <=80%) if Day 0 (or Day 1) and Day 4 values are available. Refer to http://www.[facility]-pct-calculator.com    Change in PCT <=80%  A " decrease of PCT levels below or equal to 80% defines a positive change in PCT test result representing a higher risk for 28-day all-cause mortality of patients diagnosed with severe sepsis for septic shock.    Change in PCT >80%  A decrease of PCT levels of more than 80% defines a negative change in PCT result representing a lower risk for 28-day all-cause mortality of patients diagnosed with severe sepsis or septic shock.       Blood Culture - Blood, Arm, Left [[id number]] Collected: 05/05/23 1614    Specimen: Blood from Arm, Left Updated: 05/05/23 1627    Lactic Acid, Plasma [[id number]]  (Abnormal) Collected: 05/05/23 1630    Specimen: Blood Updated: 05/05/23 1656     Lactate 2.1 mmol/L     Blood Culture - Blood, Hand, Right [[id number]] Collected: 05/05/23 1631    Specimen: Blood from Hand, Right Updated: 05/05/23 1631    Urinalysis With Culture If Indicated - Urine, Clean Catch [[id number]]  (Abnormal) Collected: 05/05/23 1648    Specimen: Urine, Clean Catch Updated: 05/05/23 1755     Color, UA Yellow     Appearance, UA Clear     pH, UA 5.5     Specific Gravity, UA 1.020     Glucose, UA Negative     Ketones, UA Negative     Bilirubin, UA Negative     Blood, UA Small (1+)     Protein,  mg/dL (2+)     Leuk Esterase, UA Negative     Nitrite, UA Negative     Urobilinogen, UA 0.2 E.U./dL    Narrative:      In absence of clinical symptoms, the presence of pyuria, bacteria, and/or nitrites on the urinalysis result does not correlate with infection.    Urinalysis, Microscopic Only - Urine, Clean Catch [[id number]]  (Abnormal) Collected: 05/05/23 1648    Specimen: Urine, Clean Catch Updated: 05/05/23 1810     RBC, UA 3-5 /HPF      WBC, UA None Seen /HPF      Bacteria, UA None Seen /HPF      Squamous Epithelial Cells, UA 0-2 /HPF      Hyaline Casts, UA 3-6 /LPF      Methodology Manual Light Microscopy    Urine Culture - Urine, Urine, Clean Catch [[id number]] Collected: 05/05/23 1648    Specimen: Urine, Clean Catch  Updated: 05/05/23 5606          I ordered the above labs and reviewed the results    RADIOLOGY  CT Abdomen Pelvis With Contrast    Result Date: 5/5/2023  ABDOMEN AND PELVIS CT WITH CONTRAST 05/05/2023  HISTORY: Right lower quadrant abdominal pain. Nausea and vomiting. Symptoms for the past 2 days. No history of prior surgery.  TECHNIQUE: Contrast enhanced CT abdomen and pelvis was performed. No comparisons. Radiation dose reduction techniques included automated exposure control or exposure modulation based on body size. Radiation audit for CT and nuclear cardiology exams in the last 12 months: 0.  ABDOMEN FINDINGS: Included lung bases show dependent atelectasis there is no effusion. Normal caliber aorta. The spleen is unremarkable and the adrenal glands are negative. The pancreas is negative. The gallbladder is unremarkable and there is mild hepatic steatosis. There is a small cyst in the anterior midpole right kidney. This measures about 1.6 cm. The left kidney is abnormal. There is a partially exophytic enhancing mass occupying majority of the upper pole left kidney. This measures 6.9 x 6.0 x 6.0 cm. Imaging features are most characteristic of renal cell carcinoma. The left lateral margin of the mass abuts the spleen and contiguous extension of malignancy could present similarly although there is no abnormal enhancement of the spleen visible at this time. The renal veins are patent and the IVC is negative. There is no threshold retroperitoneal adenopathy. There are small shotty subcentimeter short axis retroperitoneal lymph nodes present.  PELVIS FINDINGS: The bladder is negative. No drainable fluid collection. Negative prostate gland. Small amount of free fluid in the pelvis, etiology unclear. The bowel is nonobstructed and demonstrates some features in keeping with diarrhea state. Negative terminal ileum. Normal appendix. No inguinal adenopathy or fluid collection. There is no suspicious bone lesion. Bilateral  pars defects at L5-S1.  negative.      1. Large partially exophytic enhancing mass arising from the upper pole left kidney most characteristic of renal cell carcinoma measuring up to 6 cm. No threshold adenopathy. Renal veins patent. Urologic referral recommended for further care and management. 2. No CT finding to account for the patient's complaint of right lower quadrant pain. Appendix normal. 3. Mild hepatic steatosis. 4. Trace to small amount of free fluid in the pelvis. 5. No suspicious bone lesion. 6. Abnormal findings personally discussed by telephone with Rosy Hester in the emergency department.  This report was finalized on 5/5/2023 3:33 PM by Dr. Benjamín Alicia MD.        I ordered the above radiologic testing and reviewed the results    PROCEDURES  Procedures      PROGRESS AND CONSULTS  ED Course as of 05/05/23 1937   Fri May 05, 2023   1740 Patient is a 57-year-old male who presents to the emergency department for abdominal pain, nausea, vomiting, diarrhea x2 days.  Patient reports fever and chills at home.  Associated fatigue, weakness, headache.  Patient has taken Tylenol and ibuprofen at home.  No previous abdominal surgeries. [AS]   1741 Lab work reveals elevated WBC at 19.82 with left shift present.  CMP reveals acute kidney injury with creatinine 1.57 and GFR 51.  Patient's baseline creatinine typically 0.8-0.9.  Patient reports being unable to produce urine sample for analysis due to dehydration.  Denies any difficulty urinating or decreased urine production prior to coming in. [AS]   1742 CT reveals 6 cm likely renal cell carcinoma left kidney.  No acute process to correlate for right-sided abdominal pain. [AS]   1751 Lactate elevated 2.1.  Pro-Adelso elevated 0.66.  Patient at risk for sepsis.   reports at home fever.  Reports chills while in ER. We will continue fluid resuscitation with second bag of fluids.  Will start IV antibiotics. [AS]   1850 Urology consulted.  They feel the patient does  not warrant any immediate intervention from a urology standpoint.  Does not reveal renal mass corresponds with any of the acute infectious symptoms and process.  Patient can follow-up with their office outpatient. [AS]   1850 Urinalysis clean.  Patient with unknown source of infection.  Will need to be admitted to hospitalist for further work-up and antibiotics. [AS]   1937 Hospitalist Dr. Hilario accepted. [AS]      ED Course User Index  [AS] Rosy Hetser PA-C           MEDICAL DECISION MAKING    MDM  Number of Diagnoses or Management Options  Diagnosis management comments: My differential diagnosis for abdominal pain includes but is not limited to:  Gastritis, gastroenteritis, peptic ulcer disease, GERD, esophageal perforation, acute appendicitis, mesenteric adenitis, Meckel's diverticulum, epiploic appendagitis, diverticulitis, colon cancer, ulcerative colitis, Crohn's disease, intussusception, small bowel obstruction, adhesions, ischemic bowel, perforated viscus, ileus, obstipation, biliary colic, cholecystitis, cholelithiasis, Khari-D[place] Rai, hepatitis, pancreatitis, common bile duct obstruction, cholangitis, bile leak, splenic trauma, splenic rupture, splenic infarction, splenic abscess, abdominal abscess, ascites, spontaneous bacterial peritonitis, hernia, UTI, cystitis, prostatitis,ureterolithiasis, urinary obstruction, AAA, myocardial infarction, pneumonia, cancer, porphyria, DKA, medications, sickle cell, viral syndrome, zoster       Amount and/or Complexity of Data Reviewed  Decide to obtain previous medical records or to obtain history from someone other than the patient: yes    Risk of Complications, Morbidity, and/or Mortality  Presenting problems: moderate  Diagnostic procedures: moderate  Management options: moderate           DIAGNOSIS  Final diagnoses:   Diarrhea, unspecified type   Nausea and vomiting, unspecified vomiting type   DANE (acute kidney injury)   Left renal mass   Leukocytosis,  unspecified type       Latest Documented Vital Signs:  As of 19:37 EDT  BP- 130/79 HR- 89 Temp- 98.3 °F (36.8 °C) (Oral) O2 sat- 93%    DISPOSITION  Admitted to [facility]      Discussed pertinent findings with the patient/family.  Patient/Family voiced understanding of need to follow-up for recheck and further testing as needed.  Return to the Emergency Department warnings were given.         Medication List      No changes were made to your prescriptions during this visit.                   Dictated utilizing Dragon dictation     Rosy Hester PA-C  05/05/23 1939

## 2023-05-06 ENCOUNTER — READMISSION MANAGEMENT (OUTPATIENT)
Dept: CALL CENTER | Facility: HOSPITAL | Age: 58
End: 2023-05-06
Payer: COMMERCIAL

## 2023-05-06 VITALS
RESPIRATION RATE: 16 BRPM | WEIGHT: 242.4 LBS | HEIGHT: 73 IN | HEART RATE: 70 BPM | TEMPERATURE: 98 F | OXYGEN SATURATION: 95 % | SYSTOLIC BLOOD PRESSURE: 123 MMHG | DIASTOLIC BLOOD PRESSURE: 73 MMHG | BODY MASS INDEX: 32.13 KG/M2

## 2023-05-06 PROBLEM — A04.5: Status: ACTIVE | Noted: 2023-05-06

## 2023-05-06 LAB
ADV 40+41 DNA STL QL NAA+NON-PROBE: NOT DETECTED
ANION GAP SERPL CALCULATED.3IONS-SCNC: 9.7 MMOL/L (ref 5–15)
ASTRO TYP 1-8 RNA STL QL NAA+NON-PROBE: NOT DETECTED
BACTERIA SPEC AEROBE CULT: NO GROWTH
BASOPHILS # BLD AUTO: 0.02 10*3/MM3 (ref 0–0.2)
BASOPHILS NFR BLD AUTO: 0.2 % (ref 0–1.5)
BUN SERPL-MCNC: 19 MG/DL (ref 6–20)
BUN/CREAT SERPL: 14.6 (ref 7–25)
C CAYETANENSIS DNA STL QL NAA+NON-PROBE: NOT DETECTED
C COLI+JEJ+UPSA DNA STL QL NAA+NON-PROBE: DETECTED
C DIFF GDH + TOXINS A+B STL QL IA.RAPID: NEGATIVE
C DIFF GDH + TOXINS A+B STL QL IA.RAPID: NEGATIVE
CALCIUM SPEC-SCNC: 8 MG/DL (ref 8.6–10.5)
CHLORIDE SERPL-SCNC: 102 MMOL/L (ref 98–107)
CO2 SERPL-SCNC: 21.3 MMOL/L (ref 22–29)
CREAT SERPL-MCNC: 1.3 MG/DL (ref 0.76–1.27)
CRYPTOSP DNA STL QL NAA+NON-PROBE: NOT DETECTED
DEPRECATED RDW RBC AUTO: 44.7 FL (ref 37–54)
E HISTOLYT DNA STL QL NAA+NON-PROBE: NOT DETECTED
EAEC PAA PLAS AGGR+AATA ST NAA+NON-PRB: NOT DETECTED
EC STX1+STX2 GENES STL QL NAA+NON-PROBE: NOT DETECTED
EGFRCR SERPLBLD CKD-EPI 2021: 64.9 ML/MIN/1.73
EOSINOPHIL # BLD AUTO: 0 10*3/MM3 (ref 0–0.4)
EOSINOPHIL NFR BLD AUTO: 0 % (ref 0.3–6.2)
EPEC EAE GENE STL QL NAA+NON-PROBE: NOT DETECTED
ERYTHROCYTE [DISTWIDTH] IN BLOOD BY AUTOMATED COUNT: 13.9 % (ref 12.3–15.4)
ETEC LTA+ST1A+ST1B TOX ST NAA+NON-PROBE: NOT DETECTED
G LAMBLIA DNA STL QL NAA+NON-PROBE: NOT DETECTED
GLUCOSE SERPL-MCNC: 130 MG/DL (ref 65–99)
HCT VFR BLD AUTO: 42.5 % (ref 37.5–51)
HGB BLD-MCNC: 14.1 G/DL (ref 13–17.7)
IMM GRANULOCYTES # BLD AUTO: 0.04 10*3/MM3 (ref 0–0.05)
IMM GRANULOCYTES NFR BLD AUTO: 0.3 % (ref 0–0.5)
LYMPHOCYTES # BLD AUTO: 0.86 10*3/MM3 (ref 0.7–3.1)
LYMPHOCYTES NFR BLD AUTO: 7.5 % (ref 19.6–45.3)
MAGNESIUM SERPL-MCNC: 1.8 MG/DL (ref 1.6–2.6)
MCH RBC QN AUTO: 28.8 PG (ref 26.6–33)
MCHC RBC AUTO-ENTMCNC: 33.2 G/DL (ref 31.5–35.7)
MCV RBC AUTO: 86.9 FL (ref 79–97)
MONOCYTES # BLD AUTO: 0.97 10*3/MM3 (ref 0.1–0.9)
MONOCYTES NFR BLD AUTO: 8.5 % (ref 5–12)
NEUTROPHILS NFR BLD AUTO: 83.5 % (ref 42.7–76)
NEUTROPHILS NFR BLD AUTO: 9.55 10*3/MM3 (ref 1.7–7)
NOROVIRUS GI+II RNA STL QL NAA+NON-PROBE: NOT DETECTED
P SHIGELLOIDES DNA STL QL NAA+NON-PROBE: NOT DETECTED
PHOSPHATE SERPL-MCNC: 2.4 MG/DL (ref 2.5–4.5)
PLATELET # BLD AUTO: 166 10*3/MM3 (ref 140–450)
PMV BLD AUTO: 10.6 FL (ref 6–12)
POTASSIUM SERPL-SCNC: 3.7 MMOL/L (ref 3.5–5.2)
RBC # BLD AUTO: 4.89 10*6/MM3 (ref 4.14–5.8)
RVA RNA STL QL NAA+NON-PROBE: NOT DETECTED
S ENT+BONG DNA STL QL NAA+NON-PROBE: NOT DETECTED
SAPO I+II+IV+V RNA STL QL NAA+NON-PROBE: NOT DETECTED
SHIGELLA SP+EIEC IPAH ST NAA+NON-PROBE: NOT DETECTED
SODIUM SERPL-SCNC: 133 MMOL/L (ref 136–145)
V CHOL+PARA+VUL DNA STL QL NAA+NON-PROBE: NOT DETECTED
V CHOLERAE DNA STL QL NAA+NON-PROBE: NOT DETECTED
WBC NRBC COR # BLD: 11.44 10*3/MM3 (ref 3.4–10.8)
Y ENTEROCOL DNA STL QL NAA+NON-PROBE: NOT DETECTED

## 2023-05-06 PROCEDURE — 87449 NOS EACH ORGANISM AG IA: CPT | Performed by: STUDENT IN AN ORGANIZED HEALTH CARE EDUCATION/TRAINING PROGRAM

## 2023-05-06 PROCEDURE — 25010000002 MAGNESIUM SULFATE 2 GM/50ML SOLUTION: Performed by: HOSPITALIST

## 2023-05-06 PROCEDURE — 83735 ASSAY OF MAGNESIUM: CPT | Performed by: STUDENT IN AN ORGANIZED HEALTH CARE EDUCATION/TRAINING PROGRAM

## 2023-05-06 PROCEDURE — 85025 COMPLETE CBC W/AUTO DIFF WBC: CPT | Performed by: STUDENT IN AN ORGANIZED HEALTH CARE EDUCATION/TRAINING PROGRAM

## 2023-05-06 PROCEDURE — 87081 CULTURE SCREEN ONLY: CPT | Performed by: STUDENT IN AN ORGANIZED HEALTH CARE EDUCATION/TRAINING PROGRAM

## 2023-05-06 PROCEDURE — 87324 CLOSTRIDIUM AG IA: CPT | Performed by: STUDENT IN AN ORGANIZED HEALTH CARE EDUCATION/TRAINING PROGRAM

## 2023-05-06 PROCEDURE — 87507 IADNA-DNA/RNA PROBE TQ 12-25: CPT | Performed by: STUDENT IN AN ORGANIZED HEALTH CARE EDUCATION/TRAINING PROGRAM

## 2023-05-06 PROCEDURE — 94799 UNLISTED PULMONARY SVC/PX: CPT

## 2023-05-06 PROCEDURE — G0378 HOSPITAL OBSERVATION PER HR: HCPCS

## 2023-05-06 PROCEDURE — 84100 ASSAY OF PHOSPHORUS: CPT | Performed by: STUDENT IN AN ORGANIZED HEALTH CARE EDUCATION/TRAINING PROGRAM

## 2023-05-06 PROCEDURE — 96366 THER/PROPH/DIAG IV INF ADDON: CPT

## 2023-05-06 PROCEDURE — 96367 TX/PROPH/DG ADDL SEQ IV INF: CPT

## 2023-05-06 PROCEDURE — 80048 BASIC METABOLIC PNL TOTAL CA: CPT | Performed by: STUDENT IN AN ORGANIZED HEALTH CARE EDUCATION/TRAINING PROGRAM

## 2023-05-06 RX ORDER — AZITHROMYCIN 500 MG/1
TABLET, FILM COATED ORAL
Qty: 2 TABLET | Refills: 0 | Status: SHIPPED | OUTPATIENT
Start: 2023-05-06

## 2023-05-06 RX ORDER — MAGNESIUM SULFATE HEPTAHYDRATE 40 MG/ML
2 INJECTION, SOLUTION INTRAVENOUS ONCE
Status: COMPLETED | OUTPATIENT
Start: 2023-05-06 | End: 2023-05-06

## 2023-05-06 RX ORDER — AZITHROMYCIN 250 MG/1
500 TABLET, FILM COATED ORAL
Status: DISCONTINUED | OUTPATIENT
Start: 2023-05-06 | End: 2023-05-06 | Stop reason: HOSPADM

## 2023-05-06 RX ORDER — LOPERAMIDE HYDROCHLORIDE 2 MG/1
2 CAPSULE ORAL 4 TIMES DAILY PRN
Status: DISCONTINUED | OUTPATIENT
Start: 2023-05-06 | End: 2023-05-06 | Stop reason: HOSPADM

## 2023-05-06 RX ADMIN — LOPERAMIDE HYDROCHLORIDE 2 MG: 2 CAPSULE ORAL at 12:27

## 2023-05-06 RX ADMIN — ACETAMINOPHEN 1000 MG: 500 TABLET, FILM COATED ORAL at 06:21

## 2023-05-06 RX ADMIN — SODIUM CHLORIDE, POTASSIUM CHLORIDE, SODIUM LACTATE AND CALCIUM CHLORIDE 100 ML/HR: 600; 310; 30; 20 INJECTION, SOLUTION INTRAVENOUS at 07:23

## 2023-05-06 RX ADMIN — ACETAMINOPHEN 1000 MG: 500 TABLET, FILM COATED ORAL at 13:07

## 2023-05-06 RX ADMIN — POTASSIUM & SODIUM PHOSPHATES POWDER PACK 280-160-250 MG 1 PACKET: 280-160-250 PACK at 08:58

## 2023-05-06 RX ADMIN — OXYCODONE HYDROCHLORIDE 5 MG: 5 TABLET ORAL at 06:21

## 2023-05-06 RX ADMIN — MAGNESIUM SULFATE HEPTAHYDRATE 2 G: 40 INJECTION, SOLUTION INTRAVENOUS at 08:58

## 2023-05-06 RX ADMIN — OXYCODONE HYDROCHLORIDE 5 MG: 5 TABLET ORAL at 01:31

## 2023-05-06 RX ADMIN — Medication 10 ML: at 08:58

## 2023-05-06 RX ADMIN — AZITHROMYCIN MONOHYDRATE 500 MG: 250 TABLET ORAL at 15:19

## 2023-05-06 RX ADMIN — BUDESONIDE AND FORMOTEROL FUMARATE DIHYDRATE 1 PUFF: 160; 4.5 AEROSOL RESPIRATORY (INHALATION) at 09:41

## 2023-05-06 NOTE — CASE MANAGEMENT/SOCIAL WORK
Continued Stay Note  [facility]     Patient Name: Carroll Zamora  MRN: [id number]  Today's Date: 5/6/2023    Admit Date: 5/5/2023    Plan: DC home, no needs   Discharge Plan     Row Name 05/06/23 1406       Plan    Plan DC home, no needs    Plan Comments CCP spoke to pt, introduced self, role and reviewed face sheet.  Pt lives with his wife and does not use DME. He is employed full time and will need work excuse to return to work, He is IADL's, He wentto OP therapy years ago following shoulder surgery, he does not remember who.  His pharmacy is [facility] in [place] and he has no issues with copays.  His plan is to return home.  CCP will follow. Thanks, Marilyn BOTELLO               Discharge Codes    No documentation.                     Marilyn Tabor

## 2023-05-06 NOTE — OUTREACH NOTE
Prep Survey    Flowsheet Row Responses   [facility] facility patient discharged from? [place]   Is LACE score < 7 ? Yes   Eligibility [facility]   Date of Admission 05/05/23   Date of Discharge 05/06/23   Discharge Disposition Home or Self Care   Discharge diagnosis Campylobacteriosis   Does the patient have one of the following disease processes/diagnoses(primary or secondary)? Other   Does the patient have Home health ordered? No   Is there a DME ordered? No   Prep survey completed? Yes          Manisha BOTELLO - Registered Nurse

## 2023-05-06 NOTE — SIGNIFICANT NOTE
05/06/23 1440   Provider Notification   Reason for Communication Critical lab value  (GI panel results)   Provider Name Dr. Barker   Notification Route In person, on unit   Response Waiting for response

## 2023-05-06 NOTE — DISCHARGE INSTR - APPOINTMENTS
Patient will need to call  office to make 1-2 week follow up appointment                [phone]

## 2023-05-06 NOTE — DISCHARGE PLACEMENT REQUEST
"Jose Rinaldi (57 y.o. Male)     Date of Birth   1965    Social Security Number       Address   [address] [address]    Home Phone   [phone]    MRN   [id number]       Denominational   None    Marital Status                               Admission Date   5/5/23    Admission Type   Emergency    Admitting Provider   Juan M Hilario MD    Attending Provider   Juan M Hilario MD    Department, Room/Bed   [facility] MED SURG, 1417/1       Discharge Date       Discharge Disposition       Discharge Destination                               Attending Provider: Juan M Hilario MD    Allergies: No Known Allergies    Isolation: Spore   Infection: C.difficile (rule out) (05/05/23)   Code Status: CPR    Ht: 185.4 cm (73\")   Wt: 110 kg (242 lb 6.4 oz)    Admission Cmt: None   Principal Problem: Leukocytosis, unspecified type [D72.829]                 Active Insurance as of 5/5/2023     Primary Coverage     Payor Plan Insurance Group Employer/Plan Group    HUMA[employer] HUMANA [id number]     Payor Plan Address Payor Plan Phone Number Payor Plan Fax Number Effective Dates    [address] [address] [phone]  1/1/2014 - None Entered    [facility] [id number]       Subscriber Name Subscriber Birth Date Member ID       JOSE RINALDI 1965 [id number]                 Emergency Contacts      (Rel.) Home Phone Work Phone Mobile Phone    Maria C Rinaldi (Spouse) -- -- [phone]          "

## 2023-05-06 NOTE — DISCHARGE SUMMARY
Carroll Zamora  1965  [id number]    Hospitalists Discharge Summary    Date of Admission: 5/5/2023  Date of Discharge:  5/6/2023    Primary Discharge Diagnoses:  1.  Campylobacter infection  2.  DANE  3.  Left Kidney Mass    Secondary Discharge Diagnoses:  1.  Asthma  2.  Essential Hypertension    History of Present Illness (taken from H&P):  57 year old male with HTN and asthma presents with abdominal pain and diarrhea. He reports that 2 days ago he started having frequent episodes of watery stool associated with crampy abdominal pain. Describes his BMs as large volume, foul smelling and watery. Does endorse small amount of bright red blood with wiping but reports history of hemorrhoids and that this is a chronic issue. At symptom onset was initially having 3-4 episodes per day however on the day of admission states that he has been having these episodes every 2 hours. He also epigastric abdominal cramping that occurs worse with eating or drinking. Has had one episode of NBNB emesis but denies hematemesis or melena. Patient states that he has not been able to eat or drink anything over the past few days and feels very dehydrated. Denies recent diet changes or suspicious foods but does state that his son had similar symptoms approximately one week ago that self resolved in 3-4 days. No recent antibiotic use. Denies fever, chest pain, SOA, cough or dysuria.     Workup on admission notable for WBC 19.8 and DANE with Cr 1.57 (0.9 in 2021). CT a/p did not show obvious etiology for his symptoms but did show a large exophytic mass of the left kidney concerning for RCC. Urology was consulted in the ED and recommended f/u in clinic on discharge. Given DANE and concern for sepsis medicine was asked to admit the patient for further evaluation.    Hospital Course:  Mr. Zamora was admitted to the Med/Surg unit and continued on aggressive IVF resuscitation and pain control.  DANE resolved and stool revealed Campylobacter  infection.  I started him on a 3-day course of Azithromycin and stopped motility agents.  He will follow-up w/ Urology.      PCP  Patient Care Team:  Divina Olvera MD as PCP - General (Internal Medicine & Pediatrics)    Consults:   Consults     No orders found for last 30 day(s).          Operations and Procedures Performed:       CT Abdomen Pelvis With Contrast    Result Date: 5/5/2023  Narrative: ABDOMEN AND PELVIS CT WITH CONTRAST 05/05/2023  HISTORY: Right lower quadrant abdominal pain. Nausea and vomiting. Symptoms for the past 2 days. No history of prior surgery.  TECHNIQUE: Contrast enhanced CT abdomen and pelvis was performed. No comparisons. Radiation dose reduction techniques included automated exposure control or exposure modulation based on body size. Radiation audit for CT and nuclear cardiology exams in the last 12 months: 0.  ABDOMEN FINDINGS: Included lung bases show dependent atelectasis there is no effusion. Normal caliber aorta. The spleen is unremarkable and the adrenal glands are negative. The pancreas is negative. The gallbladder is unremarkable and there is mild hepatic steatosis. There is a small cyst in the anterior midpole right kidney. This measures about 1.6 cm. The left kidney is abnormal. There is a partially exophytic enhancing mass occupying majority of the upper pole left kidney. This measures 6.9 x 6.0 x 6.0 cm. Imaging features are most characteristic of renal cell carcinoma. The left lateral margin of the mass abuts the spleen and contiguous extension of malignancy could present similarly although there is no abnormal enhancement of the spleen visible at this time. The renal veins are patent and the IVC is negative. There is no threshold retroperitoneal adenopathy. There are small shotty subcentimeter short axis retroperitoneal lymph nodes present.  PELVIS FINDINGS: The bladder is negative. No drainable fluid collection. Negative prostate gland. Small amount of free  fluid in the pelvis, etiology unclear. The bowel is nonobstructed and demonstrates some features in keeping with diarrhea state. Negative terminal ileum. Normal appendix. No inguinal adenopathy or fluid collection. There is no suspicious bone lesion. Bilateral pars defects at L5-S1.  negative.      Impression: 1. Large partially exophytic enhancing mass arising from the upper pole left kidney most characteristic of renal cell carcinoma measuring up to 6 cm. No threshold adenopathy. Renal veins patent. Urologic referral recommended for further care and management. 2. No CT finding to account for the patient's complaint of right lower quadrant pain. Appendix normal. 3. Mild hepatic steatosis. 4. Trace to small amount of free fluid in the pelvis. 5. No suspicious bone lesion. 6. Abnormal findings personally discussed by telephone with Rosy Hester in the emergency department.  This report was finalized on 5/5/2023 3:33 PM by Dr. Benjamín Alicia MD.      XR Chest 1 View    Result Date: 5/5/2023  Narrative: SINGLE VIEW CHEST     HISTORY: Nausea and vomiting and diarrhea starting 2 days ago.  TECHNIQUE: PA upright chest  FINDINGS: No infiltrates or effusions. Calcified right hilar and paratracheal lymph nodes compatible prior granulomatous disease. Heart size within normal limits. No pneumothorax.      Impression: Old granulomatous disease. No acute findings.  This report was finalized on 5/5/2023 8:06 PM by Dr. FLORECITA Hester MD.        Allergies:  has No Known Allergies.      Discharge Medications:     Discharge Medications      New Medications      Instructions Start Date   azithromycin 500 MG tablet  Commonly known as: ZITHROMAX   Take one tablet by mouth daily.         Continue These Medications      Instructions Start Date   Fluticasone Furoate-Vilanterol 100-25 MCG/INH inhaler  Commonly known as: BREO ELLIPTA   1 puff, Inhalation, As Needed      levocetirizine 2.5 MG/5ML solution  Commonly known as: XYZAL    Oral      lisinopril-hydrochlorothiazide 20-12.5 MG per tablet  Commonly known as: PRINZIDE,ZESTORETIC   2 tablets, Oral, Daily      montelukast 10 MG tablet  Commonly known as: SINGULAIR   10 mg, Oral, Nightly      sildenafil 100 MG tablet  Commonly known as: VIAGRA   1 tablet, Oral, As Needed             Last Lab Results:   Lab Results (most recent)     Procedure Component Value Units Date/Time    Gastrointestinal Panel, PCR - Stool, Per Rectum [[id number]]  (Abnormal) Collected: 05/06/23 0143    Specimen: Stool from Per Rectum Updated: 05/06/23 1437     Campylobacter Detected     Plesiomonas shigelloides Not Detected     Salmonella Not Detected     Vibrio Not Detected     Vibrio cholerae Not Detected     Yersinia enterocolitica Not Detected     Enteroaggregative E. coli (EAEC) Not Detected     Enteropathogenic E. coli (EPEC) Not Detected     Enterotoxigenic E. coli (ETEC) lt/st Not Detected     Shiga-like toxin-producing E. coli (STEC) stx1/stx2 Not Detected     Shigella/Enteroinvasive E. coli (EIEC) Not Detected     Cryptosporidium Not Detected     Cyclospora cayetanensis Not Detected     Entamoeba histolytica Not Detected     Giardia lamblia Not Detected     Adenovirus F40/41 Not Detected     Astrovirus Not Detected     Norovirus GI/GII Not Detected     Rotavirus A Not Detected     Sapovirus (I, II, IV or V) Not Detected    Stool Culture, Targeted - Stool, Per Rectum [[id number]] Collected: 05/06/23 0143    Specimen: Stool from Per Rectum Updated: 05/06/23 1437    Basic Metabolic Panel [[id number]]  (Abnormal) Collected: 05/06/23 0416    Specimen: Blood Updated: 05/06/23 0533     Glucose 130 mg/dL      BUN 19 mg/dL      Creatinine 1.30 mg/dL      Sodium 133 mmol/L      Potassium 3.7 mmol/L      Chloride 102 mmol/L      CO2 21.3 mmol/L      Calcium 8.0 mg/dL      BUN/Creatinine Ratio 14.6     Anion Gap 9.7 mmol/L      eGFR 64.9 mL/min/1.73     Narrative:      GFR Normal >60  Chronic Kidney Disease <60  Kidney  Failure <15      Magnesium [[id number]]  (Normal) Collected: 05/06/23 0416    Specimen: Blood Updated: 05/06/23 0533     Magnesium 1.8 mg/dL     Phosphorus [[id number]]  (Abnormal) Collected: 05/06/23 0416    Specimen: Blood Updated: 05/06/23 0533     Phosphorus 2.4 mg/dL     CBC & Differential [[id number]]  (Abnormal) Collected: 05/06/23 0416    Specimen: Blood Updated: 05/06/23 0509    Narrative:      The following orders were created for panel order CBC & Differential.  Procedure                               Abnormality         Status                     ---------                               -----------         ------                     CBC Auto Differential[[id number]]        Abnormal            Final result                 Please view results for these tests on the individual orders.    CBC Auto Differential [[id number]]  (Abnormal) Collected: 05/06/23 0416    Specimen: Blood Updated: 05/06/23 0509     WBC 11.44 10*3/mm3      RBC 4.89 10*6/mm3      Hemoglobin 14.1 g/dL      Hematocrit 42.5 %      MCV 86.9 fL      MCH 28.8 pg      MCHC 33.2 g/dL      RDW 13.9 %      RDW-SD 44.7 fl      MPV 10.6 fL      Platelets 166 10*3/mm3      Neutrophil % 83.5 %      Lymphocyte % 7.5 %      Monocyte % 8.5 %      Eosinophil % 0.0 %      Basophil % 0.2 %      Immature Grans % 0.3 %      Neutrophils, Absolute 9.55 10*3/mm3      Lymphocytes, Absolute 0.86 10*3/mm3      Monocytes, Absolute 0.97 10*3/mm3      Eosinophils, Absolute 0.00 10*3/mm3      Basophils, Absolute 0.02 10*3/mm3      Immature Grans, Absolute 0.04 10*3/mm3     Clostridioides difficile Toxin - Stool, Per Rectum [[id number]]  (Normal) Collected: 05/06/23 0143    Specimen: Stool from Per Rectum Updated: 05/06/23 0330    Narrative:      The following orders were created for panel order Clostridioides difficile Toxin - Stool, Per Rectum.  Procedure                               Abnormality         Status                     ---------                                -----------         ------                     Clostridioides difficile...[[id number]]  Normal              Final result                 Please view results for these tests on the individual orders.    Clostridioides difficile EIA - Stool, Per Rectum [[id number]]  (Normal) Collected: 05/06/23 0143    Specimen: Stool from Per Rectum Updated: 05/06/23 0330     C Diff GDH Ag Negative     C.diff Toxin Ag Negative    Narrative:      The result indicates the absence of toxigenic C.difficile from stool specimen.    Urinalysis, Microscopic Only - Urine, Clean Catch [[id number]]  (Abnormal) Collected: 05/05/23 1648    Specimen: Urine, Clean Catch Updated: 05/05/23 1810     RBC, UA 3-5 /HPF      WBC, UA None Seen /HPF      Bacteria, UA None Seen /HPF      Squamous Epithelial Cells, UA 0-2 /HPF      Hyaline Casts, UA 3-6 /LPF      Methodology Manual Light Microscopy    Urinalysis With Culture If Indicated - Urine, Clean Catch [[id number]]  (Abnormal) Collected: 05/05/23 1648    Specimen: Urine, Clean Catch Updated: 05/05/23 1755     Color, UA Yellow     Appearance, UA Clear     pH, UA 5.5     Specific Gravity, UA 1.020     Glucose, UA Negative     Ketones, UA Negative     Bilirubin, UA Negative     Blood, UA Small (1+)     Protein,  mg/dL (2+)     Leuk Esterase, UA Negative     Nitrite, UA Negative     Urobilinogen, UA 0.2 E.U./dL    Narrative:      In absence of clinical symptoms, the presence of pyuria, bacteria, and/or nitrites on the urinalysis result does not correlate with infection.    Urine Culture - Urine, Urine, Clean Catch [[id number]] Collected: 05/05/23 1648    Specimen: Urine, Clean Catch Updated: 05/05/23 1752    Lactic Acid, Plasma [[id number]]  (Abnormal) Collected: 05/05/23 1630    Specimen: Blood Updated: 05/05/23 1656     Lactate 2.1 mmol/L     Blood Culture - Blood, Hand, Right [[id number]] Collected: 05/05/23 1631    Specimen: Blood from Hand, Right Updated: 05/05/23 1631    Blood Culture - Blood, Arm,  "Left [[id number]] Collected: 05/05/23 1614    Specimen: Blood from Arm, Left Updated: 05/05/23 1627    Procalcitonin [[id number]]  (Abnormal) Collected: 05/05/23 1342    Specimen: Blood Updated: 05/05/23 1612     Procalcitonin 0.66 ng/mL     Narrative:      As a Marker for Sepsis (Non-Neonates):    1. <0.5 ng/mL represents a low risk of severe sepsis and/or septic shock.  2. >2 ng/mL represents a high risk of severe sepsis and/or septic shock.    As a Marker for Lower Respiratory Tract Infections that require antibiotic therapy:    PCT on Admission    Antibiotic Therapy       6-12 Hrs later    >0.5                Strongly Recommended  >0.25 - <0.5        Recommended   0.1 - 0.25          Discouraged              Remeasure/reassess PCT  <0.1                Strongly Discouraged     Remeasure/reassess PCT    As 28 day mortality risk marker: \"Change in Procalcitonin Result\" (>80% or <=80%) if Day 0 (or Day 1) and Day 4 values are available. Refer to http://[email or web address]-pct-[email or web address]    Change in PCT <=80%  A decrease of PCT levels below or equal to 80% defines a positive change in PCT test result representing a higher risk for 28-day all-cause mortality of patients diagnosed with severe sepsis for septic shock.    Change in PCT >80%  A decrease of PCT levels of more than 80% defines a negative change in PCT result representing a lower risk for 28-day all-cause mortality of patients diagnosed with severe sepsis or septic shock.       [place] Draw [[id number]] Collected: 05/05/23 1342    Specimen: Blood Updated: 05/05/23 1445    Narrative:      The following orders were created for panel order [place] Draw.  Procedure                               Abnormality         Status                     ---------                               -----------         ------                     Green Top (Gel)[[id number]]                                  Final result               Lavender Top[[id number]]                                    "  Final result               Gold Top - SST[[id number]]                                   Final result               Light Blue Top[[id number]]                                   Final result                 Please view results for these tests on the individual orders.    Lavender Top [[id number]] Collected: 05/05/23 1342    Specimen: Blood Updated: 05/05/23 1445     Extra Tube hold for add-on     Comment: Auto resulted       Light Blue Top [[id number]] Collected: 05/05/23 1342    Specimen: Blood Updated: 05/05/23 1445     Extra Tube Hold for add-ons.     Comment: Auto resulted       Green Top (Gel) [[id number]] Collected: 05/05/23 1342    Specimen: Blood Updated: 05/05/23 1445     Extra Tube Hold for add-ons.     Comment: Auto resulted.       Gold Top - SST [[id number]] Collected: 05/05/23 1342    Specimen: Blood Updated: 05/05/23 1445     Extra Tube Hold for add-ons.     Comment: Auto resulted.       Comprehensive Metabolic Panel [[id number]]  (Abnormal) Collected: 05/05/23 1342    Specimen: Blood Updated: 05/05/23 1425     Glucose 179 mg/dL      BUN 21 mg/dL      Creatinine 1.57 mg/dL      Sodium 135 mmol/L      Potassium 3.8 mmol/L      Chloride 99 mmol/L      CO2 21.7 mmol/L      Calcium 8.8 mg/dL      Total Protein 7.6 g/dL      Albumin 4.4 g/dL      ALT (SGPT) 16 U/L      AST (SGOT) 14 U/L      Alkaline Phosphatase 89 U/L      Total Bilirubin 1.0 mg/dL      Globulin 3.2 gm/dL      A/G Ratio 1.4 g/dL      BUN/Creatinine Ratio 13.4     Anion Gap 14.3 mmol/L      eGFR 51.7 mL/min/1.73     Narrative:      GFR Normal >60  Chronic Kidney Disease <60  Kidney Failure <15      Lipase [[id number]]  (Normal) Collected: 05/05/23 1342    Specimen: Blood Updated: 05/05/23 1425     Lipase 13 U/L     CBC & Differential [[id number]]  (Abnormal) Collected: 05/05/23 1342    Specimen: Blood Updated: 05/05/23 1412    Narrative:      The following orders were created for panel order CBC & Differential.  Procedure                                Abnormality         Status                     ---------                               -----------         ------                     CBC Auto Differential[[id number]]        Abnormal            Final result                 Please view results for these tests on the individual orders.    CBC Auto Differential [[id number]]  (Abnormal) Collected: 05/05/23 1342    Specimen: Blood Updated: 05/05/23 1412     WBC 19.82 10*3/mm3      RBC 5.96 10*6/mm3      Hemoglobin 16.9 g/dL      Hematocrit 51.2 %      MCV 85.9 fL      MCH 28.4 pg      MCHC 33.0 g/dL      RDW 13.6 %      RDW-SD 43.4 fl      MPV 10.2 fL      Platelets 232 10*3/mm3      Neutrophil % 88.6 %      Lymphocyte % 5.4 %      Monocyte % 5.4 %      Eosinophil % 0.0 %      Basophil % 0.1 %      Immature Grans % 0.5 %      Neutrophils, Absolute 17.56 10*3/mm3      Lymphocytes, Absolute 1.08 10*3/mm3      Monocytes, Absolute 1.07 10*3/mm3      Eosinophils, Absolute 0.00 10*3/mm3      Basophils, Absolute 0.02 10*3/mm3      Immature Grans, Absolute 0.09 10*3/mm3         Imaging Results (Most Recent)     Procedure Component Value Units Date/Time    XR Chest 1 View [[id number]] Collected: 05/05/23 2004     Updated: 05/05/23 2008    Narrative:      SINGLE VIEW CHEST         HISTORY:  Nausea and vomiting and diarrhea starting 2 days ago.     TECHNIQUE:  PA upright chest     FINDINGS:  No infiltrates or effusions. Calcified right hilar and paratracheal  lymph nodes compatible prior granulomatous disease. Heart size within  normal limits. No pneumothorax.       Impression:      Old granulomatous disease. No acute findings.     This report was finalized on 5/5/2023 8:06 PM by Dr. FLORECITA Hester MD.       CT Abdomen Pelvis With Contrast [[id number]] Collected: 05/05/23 1523     Updated: 05/05/23 1535    Narrative:      ABDOMEN AND PELVIS CT WITH CONTRAST 05/05/2023     HISTORY:  Right lower quadrant abdominal pain. Nausea and vomiting. Symptoms for  the past 2 days. No history  of prior surgery.     TECHNIQUE:   Contrast enhanced CT abdomen and pelvis was performed. No comparisons.  Radiation dose reduction techniques included automated exposure control  or exposure modulation based on body size. Radiation audit for CT and  nuclear cardiology exams in the last 12 months: 0.     ABDOMEN FINDINGS:   Included lung bases show dependent atelectasis there is no effusion.  Normal caliber aorta. The spleen is unremarkable and the adrenal glands  are negative. The pancreas is negative. The gallbladder is unremarkable  and there is mild hepatic steatosis. There is a small cyst in the  anterior midpole right kidney. This measures about 1.6 cm. The left  kidney is abnormal. There is a partially exophytic enhancing mass  occupying majority of the upper pole left kidney. This measures 6.9 x  6.0 x 6.0 cm. Imaging features are most characteristic of renal cell  carcinoma. The left lateral margin of the mass abuts the spleen and  contiguous extension of malignancy could present similarly although  there is no abnormal enhancement of the spleen visible at this time. The  renal veins are patent and the IVC is negative. There is no threshold  retroperitoneal adenopathy. There are small shotty subcentimeter short  axis retroperitoneal lymph nodes present.     PELVIS FINDINGS:   The bladder is negative. No drainable fluid collection. Negative  prostate gland. Small amount of free fluid in the pelvis, etiology  unclear. The bowel is nonobstructed and demonstrates some features in  keeping with diarrhea state. Negative terminal ileum. Normal appendix.  No inguinal adenopathy or fluid collection. There is no suspicious bone  lesion. Bilateral pars defects at L5-S1.  negative.       Impression:      1. Large partially exophytic enhancing mass arising from the upper pole  left kidney most characteristic of renal cell carcinoma measuring up to  6 cm. No threshold adenopathy. Renal veins patent. Urologic  referral  recommended for further care and management.  2. No CT finding to account for the patient's complaint of right lower  quadrant pain. Appendix normal.  3. Mild hepatic steatosis.  4. Trace to small amount of free fluid in the pelvis.  5. No suspicious bone lesion.  6. Abnormal findings personally discussed by telephone with Rosy Hester in  the emergency department.     This report was finalized on 5/5/2023 3:33 PM by Dr. Benjamín Alicia MD.             PROCEDURES      Condition on Discharge: Stable    Physical Exam at Discharge  Vital Signs  Temp:  [98 °F (36.7 °C)-99.4 °F (37.4 °C)] 98 °F (36.7 °C)  Heart Rate:  [] 70  Resp:  [16-17] 16  BP: (123-143)/(73-85) 123/73    Physical Exam:  Physical Exam   Constitutional: Patient appears well-developed and well-nourished and in no acute distress   Cardiovascular: Regular rate, regular rhythm, S1 normal and S2 normal.  Exam reveals no gallop and no friction rub.  No murmur heard.  Radial pulses are 2+ and symmetric.  Pulmonary/Chest: Lungs are clear to auscultation bilaterally. No respiratory distress. No wheezes. No rhonchi. No rales.   Abdominal: Soft. Bowel sounds are normal. There is no tenderness.   Musculoskeletal: Normal Muscle tone  Extremities: No edema.   Neurological: Cranial nerves II-XII are grossly intact with no focal deficits.    Discharge Disposition  Home    Visiting Nurse:    No     Home PT/OT:  No     Home Safety Evaluation:  No     DME  None    Discharge Diet:      Dietary Orders (From admission, onward)     Start     Ordered    05/06/23 1232  Diet: Cardiac Diets; Healthy Heart (2-3 Na+); Texture: Regular Texture (IDDSI 7); Fluid Consistency: Thin (IDDSI 0)  Diet Effective Now        References:    Diet Order [facility]   Question Answer Comment   Diets: Cardiac Diets    Cardiac Diet: Healthy Heart (2-3 Na+)    Texture: Regular Texture (IDDSI 7)    Fluid Consistency: Thin (IDDSI 0)        05/06/23 1231                Activity at  Discharge:  As tolerated    Follow-up Appointments  No future appointments.  Additional Instructions for the Follow-ups that You Need to Schedule     Discharge Follow-up with PCP   As directed       Currently Documented PCP:    Divina Olvera MD    PCP Phone Number:    [phone]     Follow Up Details: 1-2 weeks               Test Results Pending at Discharge  Pending Labs     Order Current Status    Blood Culture - Blood, Arm, Left In process    Blood Culture - Blood, Hand, Right In process    Stool Culture, Targeted - Stool, Per Rectum In process    Urine Culture - Urine, Urine, Clean Catch In process           Bib Barker MD  05/06/23  15:17 EDT    Time: <30 minutes

## 2023-05-06 NOTE — PLAN OF CARE
Goal Outcome Evaluation:  Plan of Care Reviewed With: patient        Progress: improving  Outcome Evaluation: Rested well during the night. VSS. Greatly improved throughout the night. No vomiting. States feels much better. Tolerating PRN oxycodone for pain and zofran for nausea and vomiting. No shortness of breath or difficulty breathing.

## 2023-05-07 NOTE — CASE MANAGEMENT/SOCIAL WORK
Discharge Planning Assessment  [facility]     Patient Name: Carroll Zamora  MRN: [id number]  Today's Date: 5/7/2023    Admit Date: 5/5/2023    Plan: DC home, no needs   Discharge Needs Assessment    No documentation.                Discharge Plan     Row Name 05/07/23 1447       Plan    Final Discharge Disposition Code 01 - home or self-care    Final Note DC home              Continued Care and Services - Discharged on 5/6/2023 Admission date: 5/5/2023 - Discharge disposition: Home or Self Care   Coordination has not been started for this encounter.       Expected Discharge Date and Time     Expected Discharge Date Expected Discharge Time    May 6, 2023          Demographic Summary    No documentation.                Functional Status    No documentation.                Psychosocial    No documentation.                Abuse/Neglect    No documentation.                Legal    No documentation.                Substance Abuse    No documentation.                Patient Forms    No documentation.                   Marilyn Tabor

## 2023-05-08 ENCOUNTER — TRANSITIONAL CARE MANAGEMENT TELEPHONE ENCOUNTER (OUTPATIENT)
Dept: CALL CENTER | Facility: HOSPITAL | Age: 58
End: 2023-05-08
Payer: COMMERCIAL

## 2023-05-08 NOTE — OUTREACH NOTE
Call Center TCM Note    Flowsheet Row Responses   [facility] patient discharged from? [place]   Does the patient have one of the following disease processes/diagnoses(primary or secondary)? Other   TCM attempt successful? Yes   Call start time 1654   Call end time 1658   Discharge diagnosis Campylobacteriosis   Person spoke with today (if not patient) and relationship spouse   Meds reviewed with patient/caregiver? Yes   Is the patient having any side effects they believe may be caused by any medication additions or changes? No   Does the patient have all medications ordered at discharge? Yes   Is the patient taking all medications as directed (includes completed medication regime)? Yes   Comments PCP is with [facility]   Does the patient have an appointment with their PCP within 7 days of discharge? Yes  [Pt's PCP is with [facility]]   Psychosocial issues? No   Did the patient receive a copy of their discharge instructions? Yes   Nursing interventions Reviewed instructions with patient   What is the patient's perception of their health status since discharge? Improving   Is the patient/caregiver able to teach back signs and symptoms related to disease process for when to call PCP? Yes   Is the patient/caregiver able to teach back signs and symptoms related to disease process for when to call 911? Yes   Is the patient/caregiver able to teach back the hierarchy of who to call/visit for symptoms/problems? PCP, Specialist, Home health nurse, Urgent Care, ED, 911 Yes   If the patient is a current smoker, are they able to teach back resources for cessation? Not a smoker   TCM call completed? Yes   Wrap up additional comments Spouse states pt still has diarrhea. Pt has PCP hospital fu appt with Adele Maki.    Call end time 1658   Would this patient benefit from a Referral to Amb Social Work? No   Is the patient interested in additional calls from an ambulatory ?  NOTE:  applies to high risk  patients requiring additional follow-up. Eunice Toribio RN    5/8/2023, 17:00 EDT

## 2023-05-10 LAB
BACTERIA SPEC AEROBE CULT: NORMAL
BACTERIA SPEC AEROBE CULT: NORMAL

## 2023-05-30 ENCOUNTER — LAB (OUTPATIENT)
Dept: LAB | Facility: HOSPITAL | Age: 58
End: 2023-05-30

## 2023-05-30 ENCOUNTER — TRANSCRIBE ORDERS (OUTPATIENT)
Dept: ADMINISTRATIVE | Facility: HOSPITAL | Age: 58
End: 2023-05-30

## 2023-05-30 ENCOUNTER — HOSPITAL ENCOUNTER (OUTPATIENT)
Dept: CARDIOLOGY | Facility: HOSPITAL | Age: 58
Discharge: HOME | End: 2023-05-30

## 2023-05-30 DIAGNOSIS — Z01.818 PRE-OP TESTING: ICD-10-CM

## 2023-05-30 DIAGNOSIS — Z01.818 PRE-OP TESTING: Primary | ICD-10-CM

## 2023-05-30 LAB
ABO GROUP BLD: NORMAL
ANION GAP SERPL CALCULATED.3IONS-SCNC: 9.9 MMOL/L (ref 5–15)
BASOPHILS # BLD AUTO: 0.03 10*3/MM3 (ref 0–0.2)
BASOPHILS NFR BLD AUTO: 0.4 % (ref 0–1.5)
BLD GP AB SCN SERPL QL: NEGATIVE
BUN SERPL-MCNC: 15 MG/DL (ref 6–20)
BUN/CREAT SERPL: 13.8 (ref 7–25)
CALCIUM SPEC-SCNC: 9.6 MG/DL (ref 8.6–10.5)
CHLORIDE SERPL-SCNC: 102 MMOL/L (ref 98–107)
CO2 SERPL-SCNC: 24.1 MMOL/L (ref 22–29)
CREAT SERPL-MCNC: 1.09 MG/DL (ref 0.76–1.27)
DEPRECATED RDW RBC AUTO: 41.3 FL (ref 37–54)
EGFRCR SERPLBLD CKD-EPI 2021: 80.2 ML/MIN/1.73
EOSINOPHIL # BLD AUTO: 0.36 10*3/MM3 (ref 0–0.4)
EOSINOPHIL NFR BLD AUTO: 4.9 % (ref 0.3–6.2)
ERYTHROCYTE [DISTWIDTH] IN BLOOD BY AUTOMATED COUNT: 13.8 % (ref 12.3–15.4)
GLUCOSE SERPL-MCNC: 104 MG/DL (ref 65–99)
HCT VFR BLD AUTO: 44.7 % (ref 37.5–51)
HGB BLD-MCNC: 15.3 G/DL (ref 13–17.7)
IMM GRANULOCYTES # BLD AUTO: 0.02 10*3/MM3 (ref 0–0.05)
IMM GRANULOCYTES NFR BLD AUTO: 0.3 % (ref 0–0.5)
LYMPHOCYTES # BLD AUTO: 2.11 10*3/MM3 (ref 0.7–3.1)
LYMPHOCYTES NFR BLD AUTO: 28.7 % (ref 19.6–45.3)
MCH RBC QN AUTO: 28.5 PG (ref 26.6–33)
MCHC RBC AUTO-ENTMCNC: 34.2 G/DL (ref 31.5–35.7)
MCV RBC AUTO: 83.4 FL (ref 79–97)
MONOCYTES # BLD AUTO: 0.82 10*3/MM3 (ref 0.1–0.9)
MONOCYTES NFR BLD AUTO: 11.2 % (ref 5–12)
NEUTROPHILS NFR BLD AUTO: 4 10*3/MM3 (ref 1.7–7)
NEUTROPHILS NFR BLD AUTO: 54.5 % (ref 42.7–76)
NRBC BLD AUTO-RTO: 0 /100 WBC (ref 0–0.2)
PLATELET # BLD AUTO: 288 10*3/MM3 (ref 140–450)
PMV BLD AUTO: 11.1 FL (ref 6–12)
POTASSIUM SERPL-SCNC: 3.8 MMOL/L (ref 3.5–5.2)
QT INTERVAL: 393 MS
RBC # BLD AUTO: 5.36 10*6/MM3 (ref 4.14–5.8)
RH BLD: POSITIVE
SODIUM SERPL-SCNC: 136 MMOL/L (ref 136–145)
T&S EXPIRATION DATE: NORMAL
WBC NRBC COR # BLD: 7.34 10*3/MM3 (ref 3.4–10.8)

## 2023-05-30 PROCEDURE — 86901 BLOOD TYPING SEROLOGIC RH(D): CPT

## 2023-05-30 PROCEDURE — 85025 COMPLETE CBC W/AUTO DIFF WBC: CPT

## 2023-05-30 PROCEDURE — 86900 BLOOD TYPING SEROLOGIC ABO: CPT

## 2023-05-30 PROCEDURE — 80048 BASIC METABOLIC PNL TOTAL CA: CPT

## 2023-05-30 PROCEDURE — 93005 ELECTROCARDIOGRAM TRACING: CPT | Performed by: ANESTHESIOLOGY

## 2023-05-30 PROCEDURE — 86850 RBC ANTIBODY SCREEN: CPT

## 2023-05-30 PROCEDURE — 36415 COLL VENOUS BLD VENIPUNCTURE: CPT

## 2023-07-11 LAB — QT INTERVAL: 393 MS

## 2024-01-10 ENCOUNTER — HOSPITAL ENCOUNTER (EMERGENCY)
Facility: HOSPITAL | Age: 59
Discharge: HOME | End: 2024-01-10
Attending: EMERGENCY MEDICINE
Payer: COMMERCIAL

## 2024-01-10 ENCOUNTER — APPOINTMENT (OUTPATIENT)
Dept: GENERAL RADIOLOGY | Facility: HOSPITAL | Age: 59
End: 2024-01-10
Payer: COMMERCIAL

## 2024-01-10 VITALS
TEMPERATURE: 97.2 F | HEART RATE: 100 BPM | WEIGHT: 240 LBS | HEIGHT: 73 IN | RESPIRATION RATE: 18 BRPM | SYSTOLIC BLOOD PRESSURE: 131 MMHG | BODY MASS INDEX: 31.81 KG/M2 | DIASTOLIC BLOOD PRESSURE: 84 MMHG | OXYGEN SATURATION: 96 %

## 2024-01-10 DIAGNOSIS — M10.00 IDIOPATHIC GOUT, UNSPECIFIED CHRONICITY, UNSPECIFIED SITE: Primary | ICD-10-CM

## 2024-01-10 LAB
ANION GAP SERPL CALCULATED.3IONS-SCNC: 10 MMOL/L (ref 5–15)
BUN SERPL-MCNC: 29 MG/DL (ref 6–20)
BUN/CREAT SERPL: 16.6 (ref 7–25)
CALCIUM SPEC-SCNC: 9.8 MG/DL (ref 8.6–10.5)
CHLORIDE SERPL-SCNC: 101 MMOL/L (ref 98–107)
CO2 SERPL-SCNC: 24 MMOL/L (ref 22–29)
CREAT SERPL-MCNC: 1.75 MG/DL (ref 0.76–1.27)
EGFRCR SERPLBLD CKD-EPI 2021: 45.1 ML/MIN/1.73
GLUCOSE SERPL-MCNC: 109 MG/DL (ref 65–99)
POTASSIUM SERPL-SCNC: 4.1 MMOL/L (ref 3.5–5.2)
SODIUM SERPL-SCNC: 135 MMOL/L (ref 136–145)
URATE SERPL-MCNC: 11.2 MG/DL (ref 3.4–7)

## 2024-01-10 PROCEDURE — 36415 COLL VENOUS BLD VENIPUNCTURE: CPT

## 2024-01-10 PROCEDURE — 99283 EMERGENCY DEPT VISIT LOW MDM: CPT

## 2024-01-10 PROCEDURE — 73630 X-RAY EXAM OF FOOT: CPT

## 2024-01-10 PROCEDURE — 84550 ASSAY OF BLOOD/URIC ACID: CPT | Performed by: EMERGENCY MEDICINE

## 2024-01-10 PROCEDURE — 80048 BASIC METABOLIC PNL TOTAL CA: CPT | Performed by: EMERGENCY MEDICINE

## 2024-01-10 RX ORDER — COLCHICINE 0.6 MG/1
1.2 TABLET ORAL ONCE
Status: COMPLETED | OUTPATIENT
Start: 2024-01-10 | End: 2024-01-10

## 2024-01-10 RX ORDER — COLCHICINE 0.6 MG/1
TABLET ORAL
Qty: 12 TABLET | Refills: 0 | Status: SHIPPED | OUTPATIENT
Start: 2024-01-10

## 2024-01-10 RX ORDER — INDOMETHACIN 50 MG/1
CAPSULE ORAL
Qty: 24 CAPSULE | Refills: 0 | Status: SHIPPED | OUTPATIENT
Start: 2024-01-10

## 2024-01-10 RX ORDER — COLCHICINE 0.6 MG/1
0.6 TABLET ORAL DAILY
Status: DISCONTINUED | OUTPATIENT
Start: 2024-01-10 | End: 2024-01-10 | Stop reason: HOSPADM

## 2024-01-10 RX ADMIN — COLCHICINE 0.6 MG: 0.6 TABLET, FILM COATED ORAL at 11:39

## 2024-01-10 RX ADMIN — COLCHICINE 1.2 MG: 0.6 TABLET ORAL at 11:36

## 2024-01-10 NOTE — DISCHARGE INSTRUCTIONS
Rest.  Drink plenty of fluids.  Take the Indocin as prescribed.  Follow-up with Dr. Arias within 1 week.  Return to emergency department there is increased pain, numbness, tingling, weakness, change in color or temperature, fever, worse anyway at all.

## 2024-01-10 NOTE — ED PROVIDER NOTES
Subjective   History of Present Illness    Chief complaint: Foot pain    Location: Left    Quality/Severity: Moderate    Timing/Onset/Duration: Sunday    Modifying Factors: Hurts to bear weight    Associated Symptoms: No fever or chills.  No numbness, tingling, or weakness.    Narrative: This 58-year-old presents with left foot pain that started Sunday.  Patient states its gotten worse.  Patient had several episodes like this before and was told it was possibly gout or arthritis.  Denies trauma.    PCP:Lars Arias MD      Review of Systems   Musculoskeletal:         Left foot pain   Skin:  Negative for rash.   Neurological:  Negative for weakness and numbness.         Past Medical History:   Diagnosis Date    Allergic     Asthma     Hypertension        No Known Allergies    Past Surgical History:   Procedure Laterality Date    NEPHRECTOMY PARTIAL Left     June, 2023    ROTATOR CUFF REPAIR Right 2007       Family History   Problem Relation Age of Onset    Coronary artery disease Mother     Stroke Father     Lung cancer Father     Alcohol abuse Father     Coronary artery disease Father        Social History     Socioeconomic History    Marital status:    Tobacco Use    Smoking status: Never    Smokeless tobacco: Never   Vaping Use    Vaping Use: Never used   Substance and Sexual Activity    Alcohol use: Yes     Alcohol/week: 1.0 standard drink of alcohol     Types: 1 Glasses of wine per week     Comment: 2-3 DRINKS TWICE A WEEK    Drug use: No    Sexual activity: Yes     Partners: Female           Objective   Physical Exam  Vitals and nursing note reviewed.   Constitutional:       Appearance: Normal appearance.   Musculoskeletal:         General: Swelling and tenderness (Proximal dorsal into the lateral malleolus and lateral side of the foot) present.      Left lower leg: Edema (Mild left foot swelling) present.      Comments: 2+ dorsalis pedis pulse.  Decreased range of motion secondary to pain.  No  joint laxity.   Skin:     General: Skin is warm and dry.      Findings: Rash (Mild erythema left foot) present.   Neurological:      Mental Status: He is alert.      Sensory: No sensory deficit.      Motor: No weakness.         Procedures           ED Course  ED Course as of 01/12/24 1557   Wed Stephen 10, 2024   1110 The glucose is 109, BUN 29, creatinine 1.75, sodium 135, GFR 45, rest of BMP is otherwise unremarkable. [RC]   1111 Uric acid is elevated at 11.2. [RC]   1113 On 6 8023 the patient had a sodium 134 and BUN of 16 and creatinine 1.69.  Patient is more dehydrated today and has worsening renal function.  Abdomen is stable, the GFR is slightly lower at 45 today.  It was 47 on June 8, 2023. [RC]   1741 Apparently Dr. Ryan forgot to send electronically the prescriptions for colchicine and Indocin.  Due to the patient being gone longer than 2 hours, I called [facility] to phone in prescriptions for Indocin and colchicine. [TP]      ED Course User Index  [RC] Jose Miguel Ryan MD  [TP] Clayton Castro MD    11:14 EST, 01/10/24:  The patient's diagnosis of gouty arthritis of the left ankle was discussed with him.  The patient will be given a prescription for colchicine and Indocin.  The patient should follow-up with Dr. Arias within 1 week.  He should drink plenty of fluids.  The patient should return to the emergency department if there is increased pain, fever, numbness, tingling, weakness, color or temperature of the lower extremity, fever, worse in any way at all.  All the patient's questions were answered he will be discharged in good condition.                                         Medical Decision Making      Final diagnoses:   Idiopathic gout, unspecified chronicity, unspecified site       ED Disposition  ED Disposition       None            No follow-up provider specified.       Medication List      No changes were made to your prescriptions during this visit.            Jose Miguel Ryan,  MD  01/10/24 1440       Jose Miguel Ryan MD  01/12/24 6169

## 2024-01-10 NOTE — ED PROVIDER NOTES
Subjective   History of Present Illness    Review of Systems    Past Medical History:   Diagnosis Date    Allergic     Asthma     Hypertension        No Known Allergies    Past Surgical History:   Procedure Laterality Date    NEPHRECTOMY PARTIAL Left     June, 2023    ROTATOR CUFF REPAIR Right 2007       Family History   Problem Relation Age of Onset    Coronary artery disease Mother     Stroke Father     Lung cancer Father     Alcohol abuse Father     Coronary artery disease Father        Social History     Socioeconomic History    Marital status:    Tobacco Use    Smoking status: Never    Smokeless tobacco: Never   Vaping Use    Vaping Use: Never used   Substance and Sexual Activity    Alcohol use: Yes     Alcohol/week: 1.0 standard drink of alcohol     Types: 1 Glasses of wine per week     Comment: 2-3 DRINKS TWICE A WEEK    Drug use: No    Sexual activity: Yes     Partners: Female           Objective   Physical Exam    Procedures           ED Course  ED Course as of 01/10/24 1742   Wed Stephen 10, 2024   1110 The glucose is 109, BUN 29, creatinine 1.75, sodium 135, GFR 45, rest of BMP is otherwise unremarkable. [RC]   1111 Uric acid is elevated at 11.2. [RC]   1113 On 6 8023 the patient had a sodium 134 and BUN of 16 and creatinine 1.69.  Patient is more dehydrated today and has worsening renal function.  Abdomen is stable, the GFR is slightly lower at 45 today.  It was 47 on June 8, 2023. [RC]   1741 Apparently Dr. Ryan forgot to send electronically the prescriptions for colchicine and Indocin.  Due to the patient being gone longer than 2 hours, I called [facility] to phone in prescriptions for Indocin and colchicine. [TP]      ED Course User Index  [RC] Jose Miguel Ryan MD  [TP] Clayton Castro MD                                             Medical Decision Making  Problems Addressed:  Idiopathic gout, unspecified chronicity, unspecified site: complicated acute illness or injury    Amount  and/or Complexity of Data Reviewed  Radiology: ordered.    Risk  Prescription drug management.        Final diagnoses:   Idiopathic gout, unspecified chronicity, unspecified site       ED Disposition  ED Disposition       ED Disposition   Discharge    Condition   Stable    Comment   --               Lars Arias MD  [address] [id number] [phone]    In 1 week           Medication List        New Prescriptions      colchicine 0.6 MG tablet  1 tablet p.o. twice a day until gout pain resolves.     indomethacin 50 MG capsule  Commonly known as: INDOCIN  1 tablet p.o. every 8 hours as needed gout pain.               Where to Get Your Medications        These medications were sent to [facility] [address] [place] [address] [facility] [facility] - [phone]  - [phone] [address] [address]      Phone: [phone]   colchicine 0.6 MG tablet  indomethacin 50 MG capsule            Clayton Castro MD  01/10/24 0802

## 2024-04-11 ENCOUNTER — LAB (OUTPATIENT)
Dept: LAB | Facility: HOSPITAL | Age: 59
End: 2024-04-11
Payer: COMMERCIAL

## 2024-04-11 ENCOUNTER — TRANSCRIBE ORDERS (OUTPATIENT)
Dept: ADMINISTRATIVE | Facility: HOSPITAL | Age: 59
End: 2024-04-11
Payer: COMMERCIAL

## 2024-04-11 DIAGNOSIS — N18.32 CHRONIC KIDNEY DISEASE (CKD) STAGE G3B/A1, MODERATELY DECREASED GLOMERULAR FILTRATION RATE (GFR) BETWEEN 30-44 ML/MIN/1.73 SQUARE METER AND ALBUMINURIA CREATININE RATIO LESS THAN 30 MG/G (CMS/H*: Primary | ICD-10-CM

## 2024-04-11 DIAGNOSIS — I12.9 HYPERTENSIVE NEPHROPATHY: ICD-10-CM

## 2024-04-11 DIAGNOSIS — N18.32 CHRONIC KIDNEY DISEASE (CKD) STAGE G3B/A1, MODERATELY DECREASED GLOMERULAR FILTRATION RATE (GFR) BETWEEN 30-44 ML/MIN/1.73 SQUARE METER AND ALBUMINURIA CREATININE RATIO LESS THAN 30 MG/G (CMS/H*: ICD-10-CM

## 2024-04-11 LAB
ALBUMIN SERPL-MCNC: 4.3 G/DL (ref 3.5–5.2)
ALBUMIN UR-MCNC: 2 MG/DL
ANION GAP SERPL CALCULATED.3IONS-SCNC: 12.9 MMOL/L (ref 5–15)
BACTERIA UR QL AUTO: NORMAL /HPF
BILIRUB UR QL STRIP: NEGATIVE
BUN SERPL-MCNC: 29 MG/DL (ref 6–20)
BUN/CREAT SERPL: 17.2 (ref 7–25)
CALCIUM SPEC-SCNC: 9.4 MG/DL (ref 8.6–10.5)
CHLORIDE SERPL-SCNC: 103 MMOL/L (ref 98–107)
CLARITY UR: CLEAR
CO2 SERPL-SCNC: 24.1 MMOL/L (ref 22–29)
COLOR UR: YELLOW
CREAT SERPL-MCNC: 1.69 MG/DL (ref 0.76–1.27)
CREAT UR-MCNC: 140.9 MG/DL
CREAT UR-MCNC: 140.9 MG/DL
EGFRCR SERPLBLD CKD-EPI 2021: 47.1 ML/MIN/1.73
GLUCOSE SERPL-MCNC: 90 MG/DL (ref 65–99)
GLUCOSE UR STRIP-MCNC: NEGATIVE MG/DL
HGB UR QL STRIP.AUTO: NEGATIVE
HYALINE CASTS UR QL AUTO: NORMAL /LPF
KETONES UR QL STRIP: NEGATIVE
LEUKOCYTE ESTERASE UR QL STRIP.AUTO: NEGATIVE
MICROALBUMIN/CREAT UR: 14.2 MG/G (ref 0–29)
NITRITE UR QL STRIP: NEGATIVE
PH UR STRIP.AUTO: 6 [PH] (ref 5–8)
PHOSPHATE SERPL-MCNC: 2.7 MG/DL (ref 2.5–4.5)
POTASSIUM SERPL-SCNC: 4.2 MMOL/L (ref 3.5–5.2)
PROT ?TM UR-MCNC: 11.7 MG/DL
PROT UR QL STRIP: ABNORMAL
PROT/CREAT UR: 83 MG/G CREA (ref 0–200)
RBC # UR STRIP: NORMAL /HPF
REF LAB TEST METHOD: NORMAL
SODIUM SERPL-SCNC: 140 MMOL/L (ref 136–145)
SP GR UR STRIP: 1.02 (ref 1–1.03)
SQUAMOUS #/AREA URNS HPF: NORMAL /HPF
URATE SERPL-MCNC: 9.2 MG/DL (ref 3.4–7)
UROBILINOGEN UR QL STRIP: ABNORMAL
WBC # UR STRIP: NORMAL /HPF

## 2024-04-11 PROCEDURE — 84550 ASSAY OF BLOOD/URIC ACID: CPT

## 2024-04-11 PROCEDURE — 82570 ASSAY OF URINE CREATININE: CPT

## 2024-04-11 PROCEDURE — 36415 COLL VENOUS BLD VENIPUNCTURE: CPT

## 2024-04-11 PROCEDURE — 84156 ASSAY OF PROTEIN URINE: CPT

## 2024-04-11 PROCEDURE — 82043 UR ALBUMIN QUANTITATIVE: CPT

## 2024-04-11 PROCEDURE — 81001 URINALYSIS AUTO W/SCOPE: CPT

## 2024-04-11 PROCEDURE — 80069 RENAL FUNCTION PANEL: CPT

## 2024-08-15 ENCOUNTER — LAB (OUTPATIENT)
Dept: LAB | Facility: HOSPITAL | Age: 59
End: 2024-08-15
Payer: COMMERCIAL

## 2024-08-15 ENCOUNTER — TRANSCRIBE ORDERS (OUTPATIENT)
Dept: ADMINISTRATIVE | Facility: HOSPITAL | Age: 59
End: 2024-08-15
Payer: COMMERCIAL

## 2024-08-15 DIAGNOSIS — N18.32 CHRONIC KIDNEY DISEASE (CKD) STAGE G3B/A1, MODERATELY DECREASED GLOMERULAR FILTRATION RATE (GFR) BETWEEN 30-44 ML/MIN/1.73 SQUARE METER AND ALBUMINURIA CREATININE RATIO LESS THAN 30 MG/G (CMS/H*: ICD-10-CM

## 2024-08-15 DIAGNOSIS — M10.9 GOUT, UNSPECIFIED CAUSE, UNSPECIFIED CHRONICITY, UNSPECIFIED SITE: ICD-10-CM

## 2024-08-15 DIAGNOSIS — I12.9 HYPERTENSIVE NEPHROPATHY: ICD-10-CM

## 2024-08-15 DIAGNOSIS — C64.2 MALIGNANT NEOPLASM OF LEFT KIDNEY, EXCEPT RENAL PELVIS: ICD-10-CM

## 2024-08-15 DIAGNOSIS — N18.32 CHRONIC KIDNEY DISEASE (CKD) STAGE G3B/A1, MODERATELY DECREASED GLOMERULAR FILTRATION RATE (GFR) BETWEEN 30-44 ML/MIN/1.73 SQUARE METER AND ALBUMINURIA CREATININE RATIO LESS THAN 30 MG/G (CMS/H*: Primary | ICD-10-CM

## 2024-08-15 LAB
ALBUMIN SERPL-MCNC: 4.4 G/DL (ref 3.5–5.2)
ALBUMIN UR-MCNC: <1.2 MG/DL
ANION GAP SERPL CALCULATED.3IONS-SCNC: 11.9 MMOL/L (ref 5–15)
BACTERIA UR QL AUTO: NORMAL /HPF
BILIRUB UR QL STRIP: NEGATIVE
BUN SERPL-MCNC: 30 MG/DL (ref 6–20)
BUN/CREAT SERPL: 15.3 (ref 7–25)
CALCIUM SPEC-SCNC: 9.9 MG/DL (ref 8.6–10.5)
CHLORIDE SERPL-SCNC: 102 MMOL/L (ref 98–107)
CLARITY UR: CLEAR
CO2 SERPL-SCNC: 23.1 MMOL/L (ref 22–29)
COLOR UR: YELLOW
CREAT SERPL-MCNC: 1.96 MG/DL (ref 0.76–1.27)
CREAT UR-MCNC: 111.7 MG/DL
CREAT UR-MCNC: 111.7 MG/DL
EGFRCR SERPLBLD CKD-EPI 2021: 39.1 ML/MIN/1.73
GLUCOSE SERPL-MCNC: 85 MG/DL (ref 65–99)
GLUCOSE UR STRIP-MCNC: NEGATIVE MG/DL
HGB UR QL STRIP.AUTO: NEGATIVE
HYALINE CASTS UR QL AUTO: NORMAL /LPF
KETONES UR QL STRIP: NEGATIVE
LEUKOCYTE ESTERASE UR QL STRIP.AUTO: NEGATIVE
MICROALBUMIN/CREAT UR: NORMAL MG/G{CREAT}
NITRITE UR QL STRIP: NEGATIVE
PH UR STRIP.AUTO: 6 [PH] (ref 5–8)
PHOSPHATE SERPL-MCNC: 2.5 MG/DL (ref 2.5–4.5)
POTASSIUM SERPL-SCNC: 5.4 MMOL/L (ref 3.5–5.2)
PROT ?TM UR-MCNC: 7 MG/DL
PROT UR QL STRIP: NEGATIVE
PROT/CREAT UR: 62.7 MG/G CREA (ref 0–200)
RBC # UR STRIP: NORMAL /HPF
REF LAB TEST METHOD: NORMAL
SODIUM SERPL-SCNC: 137 MMOL/L (ref 136–145)
SP GR UR STRIP: 1.02 (ref 1–1.03)
SQUAMOUS #/AREA URNS HPF: NORMAL /HPF
URATE SERPL-MCNC: 7.3 MG/DL (ref 3.4–7)
UROBILINOGEN UR QL STRIP: NORMAL
WBC # UR STRIP: NORMAL /HPF

## 2024-08-15 PROCEDURE — 84156 ASSAY OF PROTEIN URINE: CPT

## 2024-08-15 PROCEDURE — 36415 COLL VENOUS BLD VENIPUNCTURE: CPT

## 2024-08-15 PROCEDURE — 80069 RENAL FUNCTION PANEL: CPT

## 2024-08-15 PROCEDURE — 81001 URINALYSIS AUTO W/SCOPE: CPT

## 2024-08-15 PROCEDURE — 84550 ASSAY OF BLOOD/URIC ACID: CPT

## 2024-08-15 PROCEDURE — 82043 UR ALBUMIN QUANTITATIVE: CPT

## 2024-08-15 PROCEDURE — 82570 ASSAY OF URINE CREATININE: CPT

## 2024-11-22 ENCOUNTER — LAB (OUTPATIENT)
Dept: LAB | Facility: HOSPITAL | Age: 59
End: 2024-11-22
Payer: COMMERCIAL

## 2024-11-22 ENCOUNTER — TRANSCRIBE ORDERS (OUTPATIENT)
Dept: ADMINISTRATIVE | Facility: HOSPITAL | Age: 59
End: 2024-11-22
Payer: COMMERCIAL

## 2024-11-22 DIAGNOSIS — M10.9 GOUT, UNSPECIFIED CAUSE, UNSPECIFIED CHRONICITY, UNSPECIFIED SITE: ICD-10-CM

## 2024-11-22 DIAGNOSIS — N18.32 STAGE 3B CHRONIC KIDNEY DISEASE: Primary | ICD-10-CM

## 2024-11-22 DIAGNOSIS — Z90.5 SINGLE KIDNEY: ICD-10-CM

## 2024-11-22 DIAGNOSIS — N18.32 STAGE 3B CHRONIC KIDNEY DISEASE: ICD-10-CM

## 2024-11-22 LAB
ANION GAP SERPL CALCULATED.3IONS-SCNC: 8.3 MMOL/L (ref 5–15)
BUN SERPL-MCNC: 32 MG/DL (ref 6–20)
BUN/CREAT SERPL: 18.9 (ref 7–25)
CALCIUM SPEC-SCNC: 9.5 MG/DL (ref 8.6–10.5)
CHLORIDE SERPL-SCNC: 106 MMOL/L (ref 98–107)
CO2 SERPL-SCNC: 24.7 MMOL/L (ref 22–29)
CREAT SERPL-MCNC: 1.69 MG/DL (ref 0.76–1.27)
EGFRCR SERPLBLD CKD-EPI 2021: 46.8 ML/MIN/1.73
GLUCOSE SERPL-MCNC: 107 MG/DL (ref 65–99)
POTASSIUM SERPL-SCNC: 4.4 MMOL/L (ref 3.5–5.2)
SODIUM SERPL-SCNC: 139 MMOL/L (ref 136–145)
URATE SERPL-MCNC: 6.8 MG/DL (ref 3.4–7)

## 2024-11-22 PROCEDURE — 36415 COLL VENOUS BLD VENIPUNCTURE: CPT

## 2024-11-22 PROCEDURE — 84550 ASSAY OF BLOOD/URIC ACID: CPT

## 2024-11-22 PROCEDURE — 80048 BASIC METABOLIC PNL TOTAL CA: CPT

## 2025-04-25 ENCOUNTER — HOSPITAL ENCOUNTER (OUTPATIENT)
Dept: GENERAL RADIOLOGY | Facility: HOSPITAL | Age: 60
Discharge: HOME | End: 2025-04-25
Admitting: NURSE PRACTITIONER
Payer: COMMERCIAL

## 2025-04-25 DIAGNOSIS — M25.562 ACUTE PAIN OF LEFT KNEE: ICD-10-CM

## 2025-04-25 PROCEDURE — 73562 X-RAY EXAM OF KNEE 3: CPT

## 2025-04-28 ENCOUNTER — PATIENT ROUNDING (BHMG ONLY) (OUTPATIENT)
Dept: URGENT CARE | Facility: CLINIC | Age: 60
End: 2025-04-28
Payer: COMMERCIAL

## 2025-04-28 NOTE — ED NOTES
Thank you for letting us care for you in your recent visit to our urgent care center. We would love to hear about your experience with us. Was this the first time you have visited our location?    We’re always looking for ways to make our patients’ experiences even better. Do you have any recommendations on ways we may improve?     I appreciate you taking the time to respond. Please be on the lookout for a survey about your recent visit from [employer] via text or email. We would greatly appreciate if you could fill that out and turn it back in. We want your voice to be heard and we value your feedback.   Thank you for choosing [facility] for your healthcare needs.

## 2025-05-16 ENCOUNTER — OFFICE VISIT (OUTPATIENT)
Dept: ORTHOPEDIC SURGERY | Facility: CLINIC | Age: 60
End: 2025-05-16
Payer: COMMERCIAL

## 2025-05-16 VITALS
HEIGHT: 73 IN | DIASTOLIC BLOOD PRESSURE: 77 MMHG | WEIGHT: 235 LBS | HEART RATE: 66 BPM | BODY MASS INDEX: 31.14 KG/M2 | SYSTOLIC BLOOD PRESSURE: 114 MMHG

## 2025-05-16 DIAGNOSIS — M17.0 PRIMARY OSTEOARTHRITIS OF KNEES, BILATERAL: Primary | ICD-10-CM

## 2025-05-16 DIAGNOSIS — M25.561 RIGHT KNEE PAIN, UNSPECIFIED CHRONICITY: ICD-10-CM

## 2025-05-16 DIAGNOSIS — M25.561 MECHANICAL KNEE PAIN, RIGHT: ICD-10-CM

## 2025-05-16 RX ORDER — LIDOCAINE HYDROCHLORIDE 10 MG/ML
8 INJECTION, SOLUTION EPIDURAL; INFILTRATION; INTRACAUDAL; PERINEURAL
Status: COMPLETED | OUTPATIENT
Start: 2025-05-16 | End: 2025-05-16

## 2025-05-16 RX ORDER — TRIAMCINOLONE ACETONIDE 40 MG/ML
80 INJECTION, SUSPENSION INTRA-ARTICULAR; INTRAMUSCULAR
Status: COMPLETED | OUTPATIENT
Start: 2025-05-16 | End: 2025-05-16

## 2025-05-16 RX ADMIN — LIDOCAINE HYDROCHLORIDE 8 ML: 10 INJECTION, SOLUTION EPIDURAL; INFILTRATION; INTRACAUDAL; PERINEURAL at 10:15

## 2025-05-16 RX ADMIN — TRIAMCINOLONE ACETONIDE 80 MG: 40 INJECTION, SUSPENSION INTRA-ARTICULAR; INTRAMUSCULAR at 10:15

## 2025-05-16 NOTE — PROGRESS NOTES
Subjective:     Patient ID: Carroll Zamora is a 59 y.o. male.    Chief Complaint:  Bilateral knee pain, new patient to examiner  History of Present Illness  History of Present Illness  The patient is a 59-year-old male who presents to the clinic today for evaluation of his left knee. Prior x-ray images completed, which are available for review in chart.  He is also experiencing pain in his right knee.  Symptom onset last for several months.  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  He has noted over the last few years difficulty with activities involving deep flexion he is unable to sit with his legs crossed or sit back on his knees completely rates his comfort at today a 9 out of 10 shooting burning in nature is experiencing redness swelling clicking popping pain with standing working driving walking aids and descending stairs coming in and out of his vehicle he is tried ice alternating with heat rest he has had a history of kidney cancer therefore unable to tolerate any oral NSAIDs currently taking Tylenol only has tried bracing oral steroids pain medication without a significant symptom resolution he does feel as if the knees are clicking catching going to give way greater now right than his left denies any prior surgical intervention to either knee.  He is exhibiting swelling, does wax and wane he also has his own business which does seem to exacerbate his symptoms as well he is ambulating on the even surfaces denies any other concerns present.         Social History     Occupational History    Not on file   Tobacco Use    Smoking status: Never     Passive exposure: Never    Smokeless tobacco: Never   Vaping Use    Vaping status: Never Used   Substance and Sexual Activity    Alcohol use: Yes      "Alcohol/week: 1.0 standard drink of alcohol     Types: 1 Glasses of wine per week     Comment: 2-3 DRINKS TWICE A WEEK    Drug use: No    Sexual activity: Yes     Partners: Female      Past Medical History:   Diagnosis Date    Allergic     Asthma     Hypertension      Past Surgical History:   Procedure Laterality Date    NEPHRECTOMY PARTIAL Left     June, 2023    ROTATOR CUFF REPAIR Right 2007       Family History   Problem Relation Age of Onset    Coronary artery disease Mother     Stroke Father     Lung cancer Father     Alcohol abuse Father     Coronary artery disease Father                Objective:  Physical Exam    Vital signs reviewed.   General: No acute distress.  Eyes: conjunctiva clear; pupils equally round and reactive  ENT: external ears and nose atraumatic; oropharynx clear  CV: no peripheral edema  Resp: normal respiratory effort  Skin: no rashes or wounds; normal turgor  Psych: mood and affect appropriate; recent and remote memory intact    Vitals:    05/16/25 0936   BP: 114/77   Pulse: 66   Weight: 107 kg (235 lb)   Height: 185.4 cm (73\")         05/16/25  0936   Weight: 107 kg (235 lb)     Body mass index is 31 kg/m².      Ortho Exam     Physical Exam  Right knee exam  Knee range of motion 0 degrees extension to 120 flexion palpable swelling the posterior joint line  Stable varus and valgus stress testing at 0 degrees and 30 degrees  Positive medial Manny's exam, moderate to severe swelling, positive effusion, mild  Positive active patellar compression test  Positive crepitus throughout arc of motion  Quad strength 5 out of 5  1+ anterior Lachman exam  Negative anterior posterior drawer exam    Left knee examined  Mild swelling present  Maximal tenderness present anterior in the medial aspect  Positive active patellar compression test  Positive crepitus throughout arc of motion  Mildly positive medial Manny's exam, negative lateral Manny's exam  1+ anterior Lachman exam  Negative anterior " posterior drawer exam  Quad strength 5 out of 5    Imaging:  XR Knee 3+ View With Sunrise Left  Result Date: 4/25/2025  Impression: Mild tricompartmental osteoarthritis. Electronically Signed: Jamarcus Kebede MD  4/25/2025 10:04 AM EDT  Workstation ID: [id number]    Independently reviewed three-view x-ray images left knee mild tricompartmental degeneration with reactive osteophytes noted minimally patellofemoral as well as the medial compartment, posterior osteophyte noted    Right Knee X-Ray  Indication: Pain    AP, Lateral, and Jessie views    Findings:  No fracture  No bony lesion  Normal soft tissues  Moderate tricompartmental degeneration greatest narrowing patellofemoral with reactive osteophytes, posterior osteophyte noted    No prior studies were available for comparison.    Assessment:        1. Right knee pain, unspecified chronicity    2. Mechanical knee pain, right    3. Primary osteoarthritis of knees, bilateral         Assessment & Plan      Plan:  Long discussion with patient regarding treatment options do have concerns regarding the right knee I do believe there is some degeneration but also some meniscal involvement at this time we did discuss options including proceeding with corticosteroid injection bilateral knees we did discuss will take 3 to 7 days before he notices any significant symptom resolution he will exhibit some tightness in stiffness today as he is moving around this will help loosen this.  I do recommend ice for swelling heat will feel better and will be able to move better with heat the ice will help significantly with swelling.  Again focus these efforts on the right knee.  We did discuss in the future in 4 weeks I will see him back in clinic I do recommend he continue with activity as tolerated continue with Tylenol only.  I will see him back in clinic in 4 weeks to reevaluate.  If he is not noticing any significant symptom improvement at the right knee we need to proceed with an MRI  to evaluate meniscal tear for further treatment evaluation.  I will see him back in clinic at that time.  Encouraged call with any question concerns.  All question answered.        - Large Joint Arthrocentesis: bilateral knee on 5/16/2025 10:15 AM  Indications: pain  Details: 22 G needle, anterolateral approach  Medications (Right): 8 mL lidocaine PF 1% 1 %; 80 mg triamcinolone acetonide 40 MG/ML  Medications (Left): 8 mL lidocaine PF 1% 1 %; 80 mg triamcinolone acetonide 40 MG/ML  Outcome: tolerated well, no immediate complications  Procedure, treatment alternatives, risks and benefits explained, specific risks discussed. Consent was given by the patient. Immediately prior to procedure a time out was called to verify the correct patient, procedure, equipment, support staff and site/side marked as required. Patient was prepped and draped in the usual sterile fashion.         Orders:  Orders Placed This Encounter   Procedures    - Large Joint Arthrocentesis: bilateral knee    XR Knee 3+ View With Gay Right     No orders of the defined types were placed in this encounter.            Dragon dictation utilized  BMI is >= 30 and <35. (Class 1 Obesity). The following options were offered after discussion;: weight loss educational material (shared in after visit summary)       Patient or patient representative verbalized consent for the use of Ambient Listening during the visit with  CASEY Hickey for chart documentation. 5/16/2025  12:45 EDT

## 2025-05-19 ENCOUNTER — PATIENT ROUNDING (BHMG ONLY) (OUTPATIENT)
Dept: ORTHOPEDIC SURGERY | Facility: CLINIC | Age: 60
End: 2025-05-19
Payer: COMMERCIAL

## 2025-05-19 NOTE — PROGRESS NOTES
A My-Chart message has been sent to the patient for PATIENT ROUNDING with [facility].

## 2025-06-12 NOTE — PROGRESS NOTES
Subjective:     Patient ID: Carroll Zamora is a 59 y.o. male.    Chief Complaint:  Follow-up bilateral knees, DJD bilaterally, corticosteroid injection bilaterally 5/16/2025, mechanical knee pain right knee  History of Present Illness  History of Present Illness  The patient is a 59-year-old male who presents to the clinic today for reevaluation of bilateral knees.    He received a corticosteroid injection at his last visit, which significantly improved symptoms in his left knee. However, he continues to experience pain in the right knee, particularly along the medial compartment. The pain is exacerbated when descending stairs or carrying any weight with the upper extremities. He also reports clicking and popping sensations and a feeling of instability on the inside of the right knee. After about 3 or 4 days, these symptoms began to ease, but he still experiences discomfort during ambulatory activities. He does not report any catching sensation in the knee or any other concerns.       Social History     Occupational History    Not on file   Tobacco Use    Smoking status: Never     Passive exposure: Never    Smokeless tobacco: Never   Vaping Use    Vaping status: Never Used   Substance and Sexual Activity    Alcohol use: Yes     Alcohol/week: 1.0 standard drink of alcohol     Types: 1 Glasses of wine per week     Comment: 2-3 DRINKS TWICE A WEEK    Drug use: No    Sexual activity: Yes     Partners: Female      Past Medical History:   Diagnosis Date    Allergic     Asthma     Hypertension      Past Surgical History:   Procedure Laterality Date    NEPHRECTOMY PARTIAL Left     June, 2023    ROTATOR CUFF REPAIR Right 2007       Family History   Problem Relation Age of Onset    Coronary artery disease Mother     Stroke Father     Lung cancer Father     Alcohol abuse Father     Coronary artery disease Father                Objective:  Physical Exam    General: No acute distress.  Eyes: conjunctiva clear; pupils equally  "round and reactive  ENT: external ears and nose atraumatic; oropharynx clear  CV: no peripheral edema  Resp: normal respiratory effort  Skin: no rashes or wounds; normal turgor  Psych: mood and affect appropriate; recent and remote memory intact    Vitals:    06/13/25 0850   Weight: 107 kg (235 lb)   Height: 185.4 cm (73\")         06/13/25  0850   Weight: 107 kg (235 lb)     Body mass index is 31 kg/m².      Ortho Exam     Physical Exam  Bilateral knees have a range of motion from 0 to 125 degrees. They are stable to varus and valgus stress at 0 degrees and 30 degrees. There is a 1+ anterior Lachman exam. Positive medial Manny's exam bilaterally, greater on the right than left with associated swelling. Mild to moderate swelling is present, but no effusion. Quadriceps strength is 5 out of 5. Positive active patellar compression test. Positive crepitus through arc of motion. Negative anterior and negative posterior drawer exam.    Assessment:        1. Mechanical knee pain, right    2. Primary osteoarthritis of knees, bilateral         Assessment & Plan  1. Bilateral knee pain.  A comprehensive discussion was held regarding potential treatment strategies. Given the persistent symptoms, particularly in the right knee, an MRI will be conducted to assess for potential meniscal injury and inform surgical planning. He will be contacted to schedule the imaging appointment. He is encouraged to reach out with any questions or concerns.   Strengthening exercises reiterated quad strengthening hamstring strengthening exercises provided all queries were addressed satisfactorily.    Follow-up  He will follow up post-testing to discuss the results and subsequent care plan.    PROCEDURE  The patient received a corticosteroid injection in the left knee at the last visit.    Orders:  Orders Placed This Encounter   Procedures    MRI knee right  wo contrast     No orders of the defined types were placed in this " encounter.        Dragon dictation utilized       Patient or patient representative verbalized consent for the use of Ambient Listening during the visit with  CASEY Hickey for chart documentation. 6/13/2025  09:19 EDT

## 2025-06-13 ENCOUNTER — OFFICE VISIT (OUTPATIENT)
Dept: ORTHOPEDIC SURGERY | Facility: CLINIC | Age: 60
End: 2025-06-13
Payer: COMMERCIAL

## 2025-06-13 VITALS — BODY MASS INDEX: 31.14 KG/M2 | WEIGHT: 235 LBS | HEIGHT: 73 IN

## 2025-06-13 DIAGNOSIS — M17.0 PRIMARY OSTEOARTHRITIS OF KNEES, BILATERAL: ICD-10-CM

## 2025-06-13 DIAGNOSIS — M25.561 MECHANICAL KNEE PAIN, RIGHT: Primary | ICD-10-CM

## 2025-06-13 PROCEDURE — 99213 OFFICE O/P EST LOW 20 MIN: CPT | Performed by: NURSE PRACTITIONER

## 2025-06-16 ENCOUNTER — TELEPHONE (OUTPATIENT)
Dept: ORTHOPEDIC SURGERY | Facility: CLINIC | Age: 60
End: 2025-06-16
Payer: COMMERCIAL